# Patient Record
Sex: MALE | Race: BLACK OR AFRICAN AMERICAN | NOT HISPANIC OR LATINO | Employment: UNEMPLOYED | URBAN - METROPOLITAN AREA
[De-identification: names, ages, dates, MRNs, and addresses within clinical notes are randomized per-mention and may not be internally consistent; named-entity substitution may affect disease eponyms.]

---

## 2023-04-28 LAB
ALANINE AMINOTRANSFERASE (SGPT) (U/L) IN SER/PLAS: 43 U/L (ref 10–52)
ALBUMIN (G/DL) IN SER/PLAS: 4.5 G/DL (ref 3.4–5)
ALKALINE PHOSPHATASE (U/L) IN SER/PLAS: 89 U/L (ref 33–120)
ANION GAP IN SER/PLAS: 16 MMOL/L (ref 10–20)
ASPARTATE AMINOTRANSFERASE (SGOT) (U/L) IN SER/PLAS: 24 U/L (ref 9–39)
BASOPHILS (10*3/UL) IN BLOOD BY AUTOMATED COUNT: 0.03 X10E9/L (ref 0–0.1)
BASOPHILS/100 LEUKOCYTES IN BLOOD BY AUTOMATED COUNT: 0.4 % (ref 0–2)
BILIRUBIN TOTAL (MG/DL) IN SER/PLAS: 0.5 MG/DL (ref 0–1.2)
CALCIUM (MG/DL) IN SER/PLAS: 9.2 MG/DL (ref 8.6–10.6)
CARBON DIOXIDE, TOTAL (MMOL/L) IN SER/PLAS: 26 MMOL/L (ref 21–32)
CD3+CD4+ ABSOLUTE: 1.15 X10E9/L (ref 0.35–2.74)
CD3+CD8+ ABSOLUTE: 0.96 X10E9/L (ref 0.08–1.49)
CD4/CD8 RATIO: 1.2 (ref 1–3.5)
CD45%: 100 %
CHLORIDE (MMOL/L) IN SER/PLAS: 105 MMOL/L (ref 98–107)
CHOLESTEROL (MG/DL) IN SER/PLAS: 218 MG/DL (ref 0–199)
CHOLESTEROL IN HDL (MG/DL) IN SER/PLAS: 47.6 MG/DL
CHOLESTEROL/HDL RATIO: 4.6
CP CD3+CD4+%: 42 % (ref 29–57)
CP CD3+CD8+%: 35 % (ref 7–31)
CREATININE (MG/DL) IN SER/PLAS: 1.07 MG/DL (ref 0.5–1.3)
EOSINOPHILS (10*3/UL) IN BLOOD BY AUTOMATED COUNT: 0.12 X10E9/L (ref 0–0.7)
EOSINOPHILS/100 LEUKOCYTES IN BLOOD BY AUTOMATED COUNT: 1.5 % (ref 0–6)
ERYTHROCYTE DISTRIBUTION WIDTH (RATIO) BY AUTOMATED COUNT: 12.9 % (ref 11.5–14.5)
ERYTHROCYTE MEAN CORPUSCULAR HEMOGLOBIN CONCENTRATION (G/DL) BY AUTOMATED: 31.9 G/DL (ref 32–36)
ERYTHROCYTE MEAN CORPUSCULAR VOLUME (FL) BY AUTOMATED COUNT: 95 FL (ref 80–100)
ERYTHROCYTES (10*6/UL) IN BLOOD BY AUTOMATED COUNT: 4.73 X10E12/L (ref 4.5–5.9)
ESTIMATED AVERAGE GLUCOSE FOR HBA1C: 108 MG/DL
FMETH: ABNORMAL
FSIT1: ABNORMAL
GFR MALE: >90 ML/MIN/1.73M2
GLUCOSE (MG/DL) IN SER/PLAS: 68 MG/DL (ref 74–99)
HEMATOCRIT (%) IN BLOOD BY AUTOMATED COUNT: 45.1 % (ref 41–52)
HEMOGLOBIN (G/DL) IN BLOOD: 14.4 G/DL (ref 13.5–17.5)
HEMOGLOBIN A1C/HEMOGLOBIN TOTAL IN BLOOD: 5.4 %
HIV-1 RNA PCR VIRAL LOAD LOG: ABNORMAL LOG10 CPY/ML
HIV-1 RNA VIRAL LOAD: ABNORMAL COPIES/ML
IMMATURE GRANULOCYTES/100 LEUKOCYTES IN BLOOD BY AUTOMATED COUNT: 0.4 % (ref 0–0.9)
LDL: 142 MG/DL (ref 0–119)
LEUKOCYTES (10*3/UL) IN BLOOD BY AUTOMATED COUNT: 8.1 X10E9/L (ref 4.4–11.3)
LYMPHOCYTES (10*3/UL) IN BLOOD BY AUTOMATED COUNT: 2.73 X10E9/L (ref 1.2–4.8)
LYMPHOCYTES/100 LEUKOCYTES IN BLOOD BY AUTOMATED COUNT: 33.9 % (ref 13–44)
MONOCYTES (10*3/UL) IN BLOOD BY AUTOMATED COUNT: 0.75 X10E9/L (ref 0.1–1)
MONOCYTES/100 LEUKOCYTES IN BLOOD BY AUTOMATED COUNT: 9.3 % (ref 2–10)
NEUTROPHILS (10*3/UL) IN BLOOD BY AUTOMATED COUNT: 4.39 X10E9/L (ref 1.2–7.7)
NEUTROPHILS/100 LEUKOCYTES IN BLOOD BY AUTOMATED COUNT: 54.5 % (ref 40–80)
NRBC (PER 100 WBCS) BY AUTOMATED COUNT: 0 /100 WBC (ref 0–0)
PLATELETS (10*3/UL) IN BLOOD AUTOMATED COUNT: 295 X10E9/L (ref 150–450)
POTASSIUM (MMOL/L) IN SER/PLAS: 4.5 MMOL/L (ref 3.5–5.3)
PROTEIN TOTAL: 7.5 G/DL (ref 6.4–8.2)
SODIUM (MMOL/L) IN SER/PLAS: 142 MMOL/L (ref 136–145)
TRIGLYCERIDE (MG/DL) IN SER/PLAS: 143 MG/DL (ref 0–149)
UREA NITROGEN (MG/DL) IN SER/PLAS: 10 MG/DL (ref 6–23)
VLDL: 29 MG/DL (ref 0–40)

## 2023-08-17 LAB
ALANINE AMINOTRANSFERASE (SGPT) (U/L) IN SER/PLAS: 36 U/L (ref 10–52)
ALBUMIN (G/DL) IN SER/PLAS: 4.4 G/DL (ref 3.4–5)
ALKALINE PHOSPHATASE (U/L) IN SER/PLAS: 100 U/L (ref 33–120)
ANION GAP IN SER/PLAS: 13 MMOL/L (ref 10–20)
ASPARTATE AMINOTRANSFERASE (SGOT) (U/L) IN SER/PLAS: 20 U/L (ref 9–39)
BASOPHILS (10*3/UL) IN BLOOD BY AUTOMATED COUNT: 0.04 X10E9/L (ref 0–0.1)
BASOPHILS/100 LEUKOCYTES IN BLOOD BY AUTOMATED COUNT: 0.5 % (ref 0–2)
BILIRUBIN TOTAL (MG/DL) IN SER/PLAS: 0.5 MG/DL (ref 0–1.2)
CALCIUM (MG/DL) IN SER/PLAS: 9.4 MG/DL (ref 8.6–10.6)
CARBON DIOXIDE, TOTAL (MMOL/L) IN SER/PLAS: 28 MMOL/L (ref 21–32)
CHLORIDE (MMOL/L) IN SER/PLAS: 104 MMOL/L (ref 98–107)
CREATININE (MG/DL) IN SER/PLAS: 1.18 MG/DL (ref 0.5–1.3)
EOSINOPHILS (10*3/UL) IN BLOOD BY AUTOMATED COUNT: 0.09 X10E9/L (ref 0–0.7)
EOSINOPHILS/100 LEUKOCYTES IN BLOOD BY AUTOMATED COUNT: 1.2 % (ref 0–6)
ERYTHROCYTE DISTRIBUTION WIDTH (RATIO) BY AUTOMATED COUNT: 12.7 % (ref 11.5–14.5)
ERYTHROCYTE MEAN CORPUSCULAR HEMOGLOBIN CONCENTRATION (G/DL) BY AUTOMATED: 33.5 G/DL (ref 32–36)
ERYTHROCYTE MEAN CORPUSCULAR VOLUME (FL) BY AUTOMATED COUNT: 96 FL (ref 80–100)
ERYTHROCYTES (10*6/UL) IN BLOOD BY AUTOMATED COUNT: 4.5 X10E12/L (ref 4.5–5.9)
GFR MALE: 87 ML/MIN/1.73M2
GLUCOSE (MG/DL) IN SER/PLAS: 67 MG/DL (ref 74–99)
HEMATOCRIT (%) IN BLOOD BY AUTOMATED COUNT: 43.3 % (ref 41–52)
HEMOGLOBIN (G/DL) IN BLOOD: 14.5 G/DL (ref 13.5–17.5)
IMMATURE GRANULOCYTES/100 LEUKOCYTES IN BLOOD BY AUTOMATED COUNT: 0.4 % (ref 0–0.9)
LEUKOCYTES (10*3/UL) IN BLOOD BY AUTOMATED COUNT: 7.3 X10E9/L (ref 4.4–11.3)
LYMPHOCYTES (10*3/UL) IN BLOOD BY AUTOMATED COUNT: 2.33 X10E9/L (ref 1.2–4.8)
LYMPHOCYTES/100 LEUKOCYTES IN BLOOD BY AUTOMATED COUNT: 31.8 % (ref 13–44)
MONOCYTES (10*3/UL) IN BLOOD BY AUTOMATED COUNT: 0.62 X10E9/L (ref 0.1–1)
MONOCYTES/100 LEUKOCYTES IN BLOOD BY AUTOMATED COUNT: 8.5 % (ref 2–10)
NEUTROPHILS (10*3/UL) IN BLOOD BY AUTOMATED COUNT: 4.22 X10E9/L (ref 1.2–7.7)
NEUTROPHILS/100 LEUKOCYTES IN BLOOD BY AUTOMATED COUNT: 57.6 % (ref 40–80)
NRBC (PER 100 WBCS) BY AUTOMATED COUNT: 0 /100 WBC (ref 0–0)
PLATELETS (10*3/UL) IN BLOOD AUTOMATED COUNT: 275 X10E9/L (ref 150–450)
POTASSIUM (MMOL/L) IN SER/PLAS: 4.1 MMOL/L (ref 3.5–5.3)
PROTEIN TOTAL: 7.4 G/DL (ref 6.4–8.2)
SODIUM (MMOL/L) IN SER/PLAS: 141 MMOL/L (ref 136–145)
UREA NITROGEN (MG/DL) IN SER/PLAS: 13 MG/DL (ref 6–23)

## 2023-08-18 LAB
CD3+CD4+ ABSOLUTE: 1 X10E9/L (ref 0.35–2.74)
CD3+CD8+ ABSOLUTE: 0.82 X10E9/L (ref 0.08–1.49)
CD4/CD8 RATIO: 1.23 (ref 1–3.5)
CD45%: 100 %
CHLAMYDIA TRACH., AMPLIFIED: NEGATIVE
CHLAMYDIA TRACH., AMPLIFIED: NEGATIVE
CP CD3+CD4+%: 43 % (ref 29–57)
CP CD3+CD8+%: 35 % (ref 7–31)
FMETH: ABNORMAL
FSIT1: ABNORMAL
HIV-1 RNA PCR VIRAL LOAD LOG: NORMAL LOG10 CPY/ML
HIV-1 RNA VIRAL LOAD: NOT DETECTED COPIES/ML
N. GONORRHEA, AMPLIFIED: NEGATIVE
N. GONORRHEA, AMPLIFIED: NEGATIVE
SYPHILIS TOTAL AB: NONREACTIVE

## 2023-10-19 DIAGNOSIS — B20 HUMAN IMMUNODEFICIENCY VIRUS (MULTI): ICD-10-CM

## 2023-10-19 RX ORDER — BICTEGRAVIR SODIUM, EMTRICITABINE, AND TENOFOVIR ALAFENAMIDE FUMARATE 50; 200; 25 MG/1; MG/1; MG/1
1 TABLET ORAL DAILY
COMMUNITY
End: 2023-10-19 | Stop reason: SDUPTHER

## 2023-10-19 RX ORDER — BICTEGRAVIR SODIUM, EMTRICITABINE, AND TENOFOVIR ALAFENAMIDE FUMARATE 50; 200; 25 MG/1; MG/1; MG/1
1 TABLET ORAL DAILY
Qty: 30 TABLET | Refills: 5 | Status: SHIPPED | OUTPATIENT
Start: 2023-10-19 | End: 2024-04-26 | Stop reason: SDUPTHER

## 2023-10-19 RX ORDER — ACETAMINOPHEN 500 MG
TABLET ORAL
COMMUNITY
End: 2024-04-26 | Stop reason: SDUPTHER

## 2024-01-02 ENCOUNTER — TELEPHONE (OUTPATIENT)
Dept: IMMUNOLOGY | Facility: CLINIC | Age: 29
End: 2024-01-02
Payer: COMMERCIAL

## 2024-01-04 ENCOUNTER — TELEPHONE (OUTPATIENT)
Dept: IMMUNOLOGY | Facility: CLINIC | Age: 29
End: 2024-01-04
Payer: COMMERCIAL

## 2024-01-11 ENCOUNTER — TELEPHONE (OUTPATIENT)
Dept: IMMUNOLOGY | Facility: CLINIC | Age: 29
End: 2024-01-11
Payer: COMMERCIAL
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
## 2024-01-16 ENCOUNTER — TELEPHONE (OUTPATIENT)
Dept: IMMUNOLOGY | Facility: CLINIC | Age: 29
End: 2024-01-16
Payer: COMMERCIAL

## 2024-01-16 NOTE — TELEPHONE ENCOUNTER
Time spent: 15 minutes  Sw contacted pt re: ohdap renewal and appt reschedule.   Pt will send paystubs to sw.  Sw notified of next appt.  Pt reports medication adherence.  Pt has no other questions for sw at this time.

## 2024-02-19 ENCOUNTER — TELEPHONE (OUTPATIENT)
Dept: IMMUNOLOGY | Facility: CLINIC | Age: 29
End: 2024-02-19
Payer: COMMERCIAL

## 2024-02-19 NOTE — TELEPHONE ENCOUNTER
Hung attempted to reach pt to check in and remind of appt this week.  Sw to meet with pt at appt to complete CM updates.  Hung LM for pt.

## 2024-02-21 ENCOUNTER — TELEPHONE (OUTPATIENT)
Dept: IMMUNOLOGY | Facility: CLINIC | Age: 29
End: 2024-02-21
Payer: COMMERCIAL

## 2024-03-19 ENCOUNTER — TELEPHONE (OUTPATIENT)
Dept: INFECTIOUS DISEASES | Facility: HOSPITAL | Age: 29
End: 2024-03-19
Payer: COMMERCIAL

## 2024-03-19 NOTE — TELEPHONE ENCOUNTER
Sw attempted to reach pt to check in and update CM paperwork.  Sw unable to reach.  Sw to inactivate in CM at this time and re-visit when pt returns to appt.     [facility] Case Management Inactivation      Date: 3/19/2024    Reason for inactivation: unable to reach      []  I have discussed with patient.  Patient aware of inactivation.          []  I have not attempted to contact client because they indicated that they did not want to be contacted.        [x]  I have attempted to contact the client to conduct case management activities.  The client has not responded.  I attempted:           [x]  Phone call         []  Mail outreach       []  I have referred patient to [facility] to try to re-engage in care.      At this time this client will now be inactive in case management.

## 2024-04-26 DIAGNOSIS — B20 HUMAN IMMUNODEFICIENCY VIRUS (MULTI): ICD-10-CM

## 2024-04-26 DIAGNOSIS — R79.89 LOW VITAMIN D LEVEL: ICD-10-CM

## 2024-04-26 RX ORDER — BICTEGRAVIR SODIUM, EMTRICITABINE, AND TENOFOVIR ALAFENAMIDE FUMARATE 50; 200; 25 MG/1; MG/1; MG/1
1 TABLET ORAL DAILY
Qty: 30 TABLET | Refills: 1 | Status: SHIPPED | OUTPATIENT
Start: 2024-04-26

## 2024-04-26 RX ORDER — ACETAMINOPHEN 500 MG
2000 TABLET ORAL DAILY
Qty: 30 CAPSULE | Refills: 1 | Status: SHIPPED | OUTPATIENT
Start: 2024-04-26 | End: 2024-05-16 | Stop reason: SDUPTHER

## 2024-04-26 NOTE — PROGRESS NOTES
Pt called for refill on Biktarvy.  Also scheduled visit with Dr. John as he has not been seen since last summer.

## 2024-04-29 ENCOUNTER — TELEPHONE (OUTPATIENT)
Dept: INFECTIOUS DISEASES | Facility: HOSPITAL | Age: 29
End: 2024-04-29
Payer: COMMERCIAL

## 2024-05-16 DIAGNOSIS — R79.89 LOW VITAMIN D LEVEL: ICD-10-CM

## 2024-05-16 RX ORDER — ACETAMINOPHEN 500 MG
2000 TABLET ORAL DAILY
Qty: 90 CAPSULE | Refills: 1 | Status: SHIPPED | OUTPATIENT
Start: 2024-05-16

## 2024-05-22 ENCOUNTER — HOSPITAL ENCOUNTER (EMERGENCY)
Facility: HOSPITAL | Age: 29
Discharge: HOME | End: 2024-05-22
Payer: COMMERCIAL

## 2024-05-22 ENCOUNTER — APPOINTMENT (OUTPATIENT)
Dept: RADIOLOGY | Facility: HOSPITAL | Age: 29
End: 2024-05-22
Payer: COMMERCIAL

## 2024-05-22 VITALS
WEIGHT: 220 LBS | OXYGEN SATURATION: 99 % | HEIGHT: 67 IN | SYSTOLIC BLOOD PRESSURE: 158 MMHG | HEART RATE: 98 BPM | BODY MASS INDEX: 34.53 KG/M2 | DIASTOLIC BLOOD PRESSURE: 88 MMHG | TEMPERATURE: 97.9 F | RESPIRATION RATE: 18 BRPM

## 2024-05-22 DIAGNOSIS — S62.340A CLOSED NONDISPLACED FRACTURE OF BASE OF SECOND METACARPAL BONE OF RIGHT HAND, INITIAL ENCOUNTER: Primary | ICD-10-CM

## 2024-05-22 PROCEDURE — 99283 EMERGENCY DEPT VISIT LOW MDM: CPT | Mod: 25

## 2024-05-22 PROCEDURE — 29075 APPL CST ELBW FNGR SHORT ARM: CPT | Performed by: PHYSICIAN ASSISTANT

## 2024-05-22 PROCEDURE — 73130 X-RAY EXAM OF HAND: CPT | Mod: RT

## 2024-05-22 PROCEDURE — 73130 X-RAY EXAM OF HAND: CPT | Mod: RIGHT SIDE | Performed by: RADIOLOGY

## 2024-05-22 RX ORDER — ACETAMINOPHEN 325 MG/1
975 TABLET ORAL ONCE
Status: COMPLETED | OUTPATIENT
Start: 2024-05-22 | End: 2024-05-22

## 2024-05-22 RX ADMIN — ACETAMINOPHEN 975 MG: 325 TABLET ORAL at 13:17

## 2024-05-22 ASSESSMENT — PAIN SCALES - GENERAL
PAINLEVEL_OUTOF10: 6
PAINLEVEL_OUTOF10: 6

## 2024-05-22 ASSESSMENT — COLUMBIA-SUICIDE SEVERITY RATING SCALE - C-SSRS
2. HAVE YOU ACTUALLY HAD ANY THOUGHTS OF KILLING YOURSELF?: NO
6. HAVE YOU EVER DONE ANYTHING, STARTED TO DO ANYTHING, OR PREPARED TO DO ANYTHING TO END YOUR LIFE?: NO
1. IN THE PAST MONTH, HAVE YOU WISHED YOU WERE DEAD OR WISHED YOU COULD GO TO SLEEP AND NOT WAKE UP?: NO

## 2024-05-22 ASSESSMENT — PAIN - FUNCTIONAL ASSESSMENT
PAIN_FUNCTIONAL_ASSESSMENT: 0-10
PAIN_FUNCTIONAL_ASSESSMENT: 0-10
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
## 2024-05-23 ENCOUNTER — SOCIAL WORK (OUTPATIENT)
Dept: INFECTIOUS DISEASES | Facility: HOSPITAL | Age: 29
End: 2024-05-23
Payer: COMMERCIAL

## 2024-05-23 ENCOUNTER — OFFICE VISIT (OUTPATIENT)
Dept: IMMUNOLOGY | Facility: CLINIC | Age: 29
End: 2024-05-23
Payer: COMMERCIAL

## 2024-05-23 VITALS
WEIGHT: 234 LBS | SYSTOLIC BLOOD PRESSURE: 150 MMHG | RESPIRATION RATE: 16 BRPM | BODY MASS INDEX: 36.73 KG/M2 | OXYGEN SATURATION: 96 % | DIASTOLIC BLOOD PRESSURE: 93 MMHG | HEIGHT: 67 IN | HEART RATE: 95 BPM | TEMPERATURE: 97.1 F

## 2024-05-23 DIAGNOSIS — I10 HYPERTENSION, UNSPECIFIED TYPE: ICD-10-CM

## 2024-05-23 DIAGNOSIS — Z11.3 ROUTINE SCREENING FOR STI (SEXUALLY TRANSMITTED INFECTION): ICD-10-CM

## 2024-05-23 DIAGNOSIS — B20 HIV INFECTION, UNSPECIFIED SYMPTOM STATUS (MULTI): Primary | ICD-10-CM

## 2024-05-23 PROBLEM — Z21 HIV-1 INFECTION: Status: ACTIVE | Noted: 2024-05-23

## 2024-05-23 PROCEDURE — 81001 URINALYSIS AUTO W/SCOPE: CPT | Performed by: INTERNAL MEDICINE

## 2024-05-23 PROCEDURE — 80053 COMPREHEN METABOLIC PANEL: CPT | Performed by: INTERNAL MEDICINE

## 2024-05-23 PROCEDURE — 83036 HEMOGLOBIN GLYCOSYLATED A1C: CPT | Performed by: INTERNAL MEDICINE

## 2024-05-23 PROCEDURE — 3077F SYST BP >= 140 MM HG: CPT | Performed by: INTERNAL MEDICINE

## 2024-05-23 PROCEDURE — 85025 COMPLETE CBC W/AUTO DIFF WBC: CPT | Performed by: INTERNAL MEDICINE

## 2024-05-23 PROCEDURE — 99214 OFFICE O/P EST MOD 30 MIN: CPT | Performed by: INTERNAL MEDICINE

## 2024-05-23 PROCEDURE — 87491 CHLMYD TRACH DNA AMP PROBE: CPT | Performed by: INTERNAL MEDICINE

## 2024-05-23 PROCEDURE — 80061 LIPID PANEL: CPT | Performed by: INTERNAL MEDICINE

## 2024-05-23 PROCEDURE — 87536 HIV-1 QUANT&REVRSE TRNSCRPJ: CPT | Performed by: INTERNAL MEDICINE

## 2024-05-23 PROCEDURE — 36415 COLL VENOUS BLD VENIPUNCTURE: CPT | Performed by: INTERNAL MEDICINE

## 2024-05-23 PROCEDURE — 86780 TREPONEMA PALLIDUM: CPT | Performed by: INTERNAL MEDICINE

## 2024-05-23 PROCEDURE — 88185 FLOWCYTOMETRY/TC ADD-ON: CPT | Mod: TC | Performed by: INTERNAL MEDICINE

## 2024-05-23 PROCEDURE — 3080F DIAST BP >= 90 MM HG: CPT | Performed by: INTERNAL MEDICINE

## 2024-05-23 RX ORDER — LOSARTAN POTASSIUM 25 MG/1
25 TABLET ORAL DAILY
Qty: 30 TABLET | Refills: 3 | Status: SHIPPED | OUTPATIENT
Start: 2024-05-23

## 2024-05-23 ASSESSMENT — PAIN SCALES - GENERAL: PAINLEVEL: 5
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
## 2024-05-23 NOTE — LETTER
05/23/24    Rex Huston DO  Office Address Unavailable  As Of 7/16/2022      Dear Dr. Rex Huston DO,    I am writing to confirm that your patient, Derrick Forbes, received care in my office on 05/23/24. I have enclosed a summary of the care provided to Derrick for your reference.    Please contact me with any questions you may have regarding the visit.    Sincerely,         Dipika John MD  [address] [address] [phone]    CC: No Recipients
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
## 2024-05-24 LAB
ALBUMIN SERPL BCP-MCNC: 4.4 G/DL (ref 3.4–5)
ALP SERPL-CCNC: 99 U/L (ref 33–120)
ALT SERPL W P-5'-P-CCNC: 43 U/L (ref 10–52)
ANION GAP SERPL CALC-SCNC: 17 MMOL/L (ref 10–20)
APPEARANCE UR: CLEAR
AST SERPL W P-5'-P-CCNC: 23 U/L (ref 9–39)
BASOPHILS # BLD AUTO: 0.04 X10*3/UL (ref 0–0.1)
BASOPHILS NFR BLD AUTO: 0.4 %
BILIRUB SERPL-MCNC: 0.9 MG/DL (ref 0–1.2)
BILIRUB UR STRIP.AUTO-MCNC: NEGATIVE MG/DL
BUN SERPL-MCNC: 12 MG/DL (ref 6–23)
C TRACH RRNA SPEC QL NAA+PROBE: NEGATIVE
CALCIUM SERPL-MCNC: 9.6 MG/DL (ref 8.6–10.6)
CD3+CD4+ CELLS # BLD: 1.35 X10E9/L
CD3+CD4+ CELLS # BLD: 1351 /MM3
CD3+CD4+ CELLS NFR BLD: 44 %
CD3+CD4+ CELLS/CD3+CD8+ CLL BLD: 1.29 %
CD3+CD8+ CELLS # BLD: 1.04 X10E9/L
CD3+CD8+ CELLS NFR BLD: 34 %
CHLORIDE SERPL-SCNC: 104 MMOL/L (ref 98–107)
CHOLEST SERPL-MCNC: 207 MG/DL (ref 0–199)
CHOLESTEROL/HDL RATIO: 4.5
CO2 SERPL-SCNC: 25 MMOL/L (ref 21–32)
COLOR UR: YELLOW
CREAT SERPL-MCNC: 1.27 MG/DL (ref 0.5–1.3)
EGFRCR SERPLBLD CKD-EPI 2021: 80 ML/MIN/1.73M*2
EOSINOPHIL # BLD AUTO: 0.1 X10*3/UL (ref 0–0.7)
EOSINOPHIL NFR BLD AUTO: 1.1 %
ERYTHROCYTE [DISTWIDTH] IN BLOOD BY AUTOMATED COUNT: 13.2 % (ref 11.5–14.5)
EST. AVERAGE GLUCOSE BLD GHB EST-MCNC: 97 MG/DL
GLUCOSE SERPL-MCNC: 91 MG/DL (ref 74–99)
GLUCOSE UR STRIP.AUTO-MCNC: NORMAL MG/DL
HBA1C MFR BLD: 5 %
HCT VFR BLD AUTO: 47.8 % (ref 41–52)
HDLC SERPL-MCNC: 46.1 MG/DL
HGB BLD-MCNC: 14.9 G/DL (ref 13.5–17.5)
HIV1 RNA # PLAS NAA DL=20: ABNORMAL {COPIES}/ML
HIV1 RNA SPEC NAA+PROBE-LOG#: ABNORMAL {LOG_COPIES}/ML
IMM GRANULOCYTES # BLD AUTO: 0.02 X10*3/UL (ref 0–0.7)
IMM GRANULOCYTES NFR BLD AUTO: 0.2 % (ref 0–0.9)
KETONES UR STRIP.AUTO-MCNC: NEGATIVE MG/DL
LDLC SERPL CALC-MCNC: 112 MG/DL
LEUKOCYTE ESTERASE UR QL STRIP.AUTO: NEGATIVE
LYMPHOCYTES # BLD AUTO: 3.07 X10*3/UL (ref 1.2–4.8)
LYMPHOCYTES # SPEC AUTO: 3.07 X10*3/UL
LYMPHOCYTES NFR BLD AUTO: 32.9 %
MCH RBC QN AUTO: 31.6 PG (ref 26–34)
MCHC RBC AUTO-ENTMCNC: 31.2 G/DL (ref 32–36)
MCV RBC AUTO: 101 FL (ref 80–100)
MONOCYTES # BLD AUTO: 0.8 X10*3/UL (ref 0.1–1)
MONOCYTES NFR BLD AUTO: 8.6 %
N GONORRHOEA DNA SPEC QL PROBE+SIG AMP: NEGATIVE
NEUTROPHILS # BLD AUTO: 5.31 X10*3/UL (ref 1.2–7.7)
NEUTROPHILS NFR BLD AUTO: 56.8 %
NITRITE UR QL STRIP.AUTO: NEGATIVE
NON HDL CHOLESTEROL: 161 MG/DL (ref 0–149)
NRBC BLD-RTO: 0 /100 WBCS (ref 0–0)
PH UR STRIP.AUTO: 6 [PH]
PLATELET # BLD AUTO: 283 X10*3/UL (ref 150–450)
POTASSIUM SERPL-SCNC: 3.7 MMOL/L (ref 3.5–5.3)
PROT SERPL-MCNC: 7.5 G/DL (ref 6.4–8.2)
PROT UR STRIP.AUTO-MCNC: ABNORMAL MG/DL
RBC # BLD AUTO: 4.72 X10*6/UL (ref 4.5–5.9)
RBC # UR STRIP.AUTO: NEGATIVE /UL
RBC #/AREA URNS AUTO: NORMAL /HPF
SODIUM SERPL-SCNC: 142 MMOL/L (ref 136–145)
SP GR UR STRIP.AUTO: 1.03
TREPONEMA PALLIDUM IGG+IGM AB [PRESENCE] IN SERUM OR PLASMA BY IMMUNOASSAY: NONREACTIVE
TRIGL SERPL-MCNC: 245 MG/DL (ref 0–149)
UROBILINOGEN UR STRIP.AUTO-MCNC: ABNORMAL MG/DL
VLDL: 49 MG/DL (ref 0–40)
WBC # BLD AUTO: 9.3 X10*3/UL (ref 4.4–11.3)
WBC #/AREA URNS AUTO: NORMAL /HPF

## 2024-05-29 ENCOUNTER — HOSPITAL ENCOUNTER (OUTPATIENT)
Dept: RADIOLOGY | Facility: CLINIC | Age: 29
Discharge: HOME | End: 2024-05-29
Payer: COMMERCIAL

## 2024-05-29 ENCOUNTER — OFFICE VISIT (OUTPATIENT)
Dept: ORTHOPEDIC SURGERY | Facility: CLINIC | Age: 29
End: 2024-05-29
Payer: COMMERCIAL

## 2024-05-29 VITALS — HEIGHT: 68 IN | BODY MASS INDEX: 35.46 KG/M2 | WEIGHT: 234 LBS

## 2024-05-29 DIAGNOSIS — S62.310A DISPLACED FRACTURE OF BASE OF SECOND METACARPAL BONE, RIGHT HAND, INITIAL ENCOUNTER FOR CLOSED FRACTURE: Primary | ICD-10-CM

## 2024-05-29 DIAGNOSIS — M79.641 HAND PAIN, RIGHT: ICD-10-CM

## 2024-05-29 PROCEDURE — 73130 X-RAY EXAM OF HAND: CPT | Mod: RT

## 2024-05-29 PROCEDURE — 73130 X-RAY EXAM OF HAND: CPT | Mod: RIGHT SIDE | Performed by: RADIOLOGY

## 2024-05-29 PROCEDURE — 99203 OFFICE O/P NEW LOW 30 MIN: CPT | Performed by: ORTHOPAEDIC SURGERY
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
## 2024-06-03 ENCOUNTER — OFFICE VISIT (OUTPATIENT)
Dept: ORTHOPEDIC SURGERY | Facility: CLINIC | Age: 29
End: 2024-06-03
Payer: COMMERCIAL

## 2024-06-03 DIAGNOSIS — S62.310A DISPLACED FRACTURE OF BASE OF SECOND METACARPAL BONE, RIGHT HAND, INITIAL ENCOUNTER FOR CLOSED FRACTURE: Primary | ICD-10-CM

## 2024-06-03 PROCEDURE — 26600 TREAT METACARPAL FRACTURE: CPT | Performed by: ORTHOPAEDIC SURGERY
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
## 2024-06-04 PROCEDURE — L3809 WHFO W/O JOINTS PRE OTS: HCPCS | Performed by: ORTHOPAEDIC SURGERY

## 2024-06-24 ENCOUNTER — APPOINTMENT (OUTPATIENT)
Dept: ORTHOPEDIC SURGERY | Facility: CLINIC | Age: 29
End: 2024-06-24
Payer: COMMERCIAL

## 2024-06-24 ENCOUNTER — HOSPITAL ENCOUNTER (OUTPATIENT)
Dept: RADIOLOGY | Facility: CLINIC | Age: 29
Discharge: HOME | End: 2024-06-24
Payer: COMMERCIAL

## 2024-06-24 VITALS — WEIGHT: 234 LBS | HEIGHT: 68 IN | BODY MASS INDEX: 35.46 KG/M2

## 2024-06-24 DIAGNOSIS — S62.340A NONDISPLACED FRACTURE OF BASE OF SECOND METACARPAL BONE, RIGHT HAND, INITIAL ENCOUNTER FOR CLOSED FRACTURE: Primary | ICD-10-CM

## 2024-06-24 DIAGNOSIS — S62.340A NONDISPLACED FRACTURE OF BASE OF SECOND METACARPAL BONE, RIGHT HAND, INITIAL ENCOUNTER FOR CLOSED FRACTURE: ICD-10-CM

## 2024-06-24 PROCEDURE — 73130 X-RAY EXAM OF HAND: CPT | Mod: RT

## 2024-06-24 PROCEDURE — 99024 POSTOP FOLLOW-UP VISIT: CPT | Performed by: ORTHOPAEDIC SURGERY

## 2024-06-24 PROCEDURE — 73130 X-RAY EXAM OF HAND: CPT | Mod: RIGHT SIDE | Performed by: RADIOLOGY

## 2024-06-24 NOTE — PROGRESS NOTES
Subjective    Patient ID: Derrick Forbes is a 28 y.o. male.    Chief Complaint: Follow-up of the Right Hand (INDEX FINGER)     Last Surgery: No surgery found  Last Surgery Date: No surgery found    HPI  Patient comes in for follow-up of his right second metacarpal fracture.  This was treated conservatively.  Patient states he has no complaints of pain.  He denies numbness or paresthesias.    Objective   Ortho Exam  Patient is in no acute distress peer exam of his right hand reveals a skin envelope is intact.  He has no tenderness to palpation over the second metacarpal base.  He has full range of motion in his right index finger.  There is no malrotation.    Image Results:  X-rays of his right hand were personally reviewed today.  Fracture is maintained adequate alignment.  There is evidence of callus forming across the fracture site.    Assessment/Plan   Encounter Diagnoses:  Nondisplaced fracture of base of second metacarpal bone, right hand, initial encounter for closed fracture    Orders Placed This Encounter    XR hand right 3+ views     Patient has a healed right second metacarpal fracture.  He may use his right hand is much as he can tolerate.  He will follow-up as his symptoms dictate.

## 2024-06-25 DIAGNOSIS — B20 HUMAN IMMUNODEFICIENCY VIRUS (MULTI): ICD-10-CM

## 2024-06-25 RX ORDER — BICTEGRAVIR SODIUM, EMTRICITABINE, AND TENOFOVIR ALAFENAMIDE FUMARATE 50; 200; 25 MG/1; MG/1; MG/1
1 TABLET ORAL DAILY
Qty: 30 TABLET | Refills: 2 | Status: SHIPPED | OUTPATIENT
Start: 2024-06-25

## 2024-07-01 ENCOUNTER — TELEPHONE (OUTPATIENT)
Dept: INFECTIOUS DISEASES | Facility: HOSPITAL | Age: 29
End: 2024-07-01
Payer: COMMERCIAL

## 2024-07-01 NOTE — TELEPHONE ENCOUNTER
Time: 15 minutes  Sw contacted pt re: OHDAP renewal.  Pt will submit first paystub from new job.  Pt starting new job 7/8 - insurance will start right away.  Pt will also send insurance card.  Pt has no other questions for sw at this time.

## 2024-07-11 ENCOUNTER — TELEPHONE (OUTPATIENT)
Dept: INFECTIOUS DISEASES | Facility: HOSPITAL | Age: 29
End: 2024-07-11
Payer: COMMERCIAL

## 2024-07-23 ENCOUNTER — TELEPHONE (OUTPATIENT)
Dept: INFECTIOUS DISEASES | Facility: HOSPITAL | Age: 29
End: 2024-07-23
Payer: COMMERCIAL

## 2024-07-23 NOTE — TELEPHONE ENCOUNTER
Time: 15 minutes  Sw received call from pt.  Pt will try to send new job information to sw this week for ohdap renewal.  Pt wants to know next appt - sw confirmed with pt.  Pt would like to come in for RN visit before appt for STI check.  Sw to communicate to RN.  Pt has no other questions for sw at this time.

## 2024-07-25 DIAGNOSIS — I10 HYPERTENSION, UNSPECIFIED TYPE: ICD-10-CM

## 2024-07-25 DIAGNOSIS — R79.89 LOW VITAMIN D LEVEL: ICD-10-CM

## 2024-07-25 DIAGNOSIS — B20 HUMAN IMMUNODEFICIENCY VIRUS (MULTI): ICD-10-CM

## 2024-07-25 RX ORDER — ACETAMINOPHEN 500 MG
2000 TABLET ORAL DAILY
Qty: 90 CAPSULE | Refills: 3 | Status: SHIPPED | OUTPATIENT
Start: 2024-07-25

## 2024-07-25 RX ORDER — LOSARTAN POTASSIUM 25 MG/1
25 TABLET ORAL DAILY
Qty: 30 TABLET | Refills: 3 | Status: SHIPPED | OUTPATIENT
Start: 2024-07-25

## 2024-07-25 RX ORDER — BICTEGRAVIR SODIUM, EMTRICITABINE, AND TENOFOVIR ALAFENAMIDE FUMARATE 50; 200; 25 MG/1; MG/1; MG/1
1 TABLET ORAL DAILY
Qty: 30 TABLET | Refills: 3 | Status: SHIPPED | OUTPATIENT
Start: 2024-07-25

## 2024-07-30 ENCOUNTER — TELEPHONE (OUTPATIENT)
Dept: INFECTIOUS DISEASES | Facility: HOSPITAL | Age: 29
End: 2024-07-30
Payer: COMMERCIAL

## 2024-07-31 ENCOUNTER — CLINICAL SUPPORT (OUTPATIENT)
Dept: IMMUNOLOGY | Facility: CLINIC | Age: 29
End: 2024-07-31
Payer: COMMERCIAL

## 2024-07-31 DIAGNOSIS — Z21 ASYMPTOMATIC HIV INFECTION, WITH NO HISTORY OF HIV-RELATED ILLNESS (MULTI): ICD-10-CM

## 2024-07-31 LAB — TREPONEMA PALLIDUM IGG+IGM AB [PRESENCE] IN SERUM OR PLASMA BY IMMUNOASSAY: NONREACTIVE

## 2024-07-31 PROCEDURE — 87491 CHLMYD TRACH DNA AMP PROBE: CPT

## 2024-07-31 PROCEDURE — 86780 TREPONEMA PALLIDUM: CPT

## 2024-07-31 PROCEDURE — 36415 COLL VENOUS BLD VENIPUNCTURE: CPT

## 2024-08-01 LAB
C TRACH RRNA SPEC QL NAA+PROBE: NEGATIVE
N GONORRHOEA DNA SPEC QL PROBE+SIG AMP: NEGATIVE

## 2024-08-13 DIAGNOSIS — Z11.3 SCREENING EXAMINATION FOR STI: ICD-10-CM

## 2024-08-15 ENCOUNTER — CLINICAL SUPPORT (OUTPATIENT)
Dept: IMMUNOLOGY | Facility: CLINIC | Age: 29
End: 2024-08-15

## 2024-08-15 DIAGNOSIS — Z11.3 SCREENING EXAMINATION FOR STI: ICD-10-CM

## 2024-08-15 LAB
HCV AB SER QL: NONREACTIVE
HERPES SIMPLEX VIRUS 1 IGG: 3.3 INDEX
HERPES SIMPLEX VIRUS 2 IGG: <0.2 INDEX

## 2024-08-15 PROCEDURE — 36415 COLL VENOUS BLD VENIPUNCTURE: CPT

## 2024-08-15 PROCEDURE — 86803 HEPATITIS C AB TEST: CPT

## 2024-08-15 PROCEDURE — 86695 HERPES SIMPLEX TYPE 1 TEST: CPT

## 2024-09-26 ENCOUNTER — APPOINTMENT (OUTPATIENT)
Dept: IMMUNOLOGY | Facility: CLINIC | Age: 29
End: 2024-09-26
Payer: COMMERCIAL

## 2024-10-01 ENCOUNTER — APPOINTMENT (OUTPATIENT)
Dept: IMMUNOLOGY | Facility: CLINIC | Age: 29
End: 2024-10-01
Payer: COMMERCIAL

## 2024-10-14 ENCOUNTER — TELEPHONE (OUTPATIENT)
Dept: INFECTIOUS DISEASES | Facility: HOSPITAL | Age: 29
End: 2024-10-14

## 2024-10-14 NOTE — TELEPHONE ENCOUNTER
Sw attempted to reach pt to confirm upcoming appt.  Hung LM for pt.  Sw to meet with pt at 11/6 appt to update CM.

## 2024-11-06 ENCOUNTER — TELEPHONE (OUTPATIENT)
Dept: INFECTIOUS DISEASES | Facility: HOSPITAL | Age: 29
End: 2024-11-06

## 2024-11-25 ENCOUNTER — TELEPHONE (OUTPATIENT)
Dept: INFECTIOUS DISEASES | Facility: HOSPITAL | Age: 29
End: 2024-11-25

## 2024-12-03 ENCOUNTER — TELEPHONE (OUTPATIENT)
Dept: INFECTIOUS DISEASES | Facility: HOSPITAL | Age: 29
End: 2024-12-03

## 2024-12-03 NOTE — TELEPHONE ENCOUNTER
"[facility] CASE MANAGEMENT PSYCHOSOCIAL ASSESSMENT AND ISP     Sw completed CM semi-annual assessment with pt over the phone.       Basic Needs:   Income / financial concerns: pt started new job and has been working to catch up on past bills.    Transportation: pt has car and is able get to where he needs. pt reports no major changes.   Housing: pt currently staying with sister, brother in law, and niece and nephew.  Pt is pushing back his timeline to find his own place so he can get caught up on past bills.   Utilities: pt reports no major changes.    Digital Connectivity/Internet: pt reports no major changes.      Medical Care and Medication Adherence: pt has missed recent appointments - notes that \"stuff came up.\"  Pt wants to set up next appt.   Last ID appt: 2024  Next ID appt:  to be scheduled  Number of doses missed in last 7 days: 1 day of meds missed in last week     Mental Health: pt notes that after heavy drinking he recently had a bout with anxiety.  Wants to address at next MD appt.      Substance Abuse: Pt reports that he drank \"a lot\" over  - was concerned with how he felt afterwards.  Pt notes he is done drinking a while.  Sw to continue to check in with pt re: this.      Oral Health:   Last dental appt: pt had dental appointment 24.  Pt went to [facility] dental.   Does the patient have any dental concerns? Pt will call to find in-network dentist.  Pt has small cavities that need to be filled.      Health Insurance: pt has active insurance through current job.  Pt also has active ohdap for copays - set to  25.       Safety: Pt reports no unmet needs at this time.       Support System: pt feels satisfied with support system.     Sexual Health/Risk Reduction:   Sexually Active: pt reports that he has not been sexually active recently.   Disclosure: Pt reports no unmet needs at this time.   Patient aware of and expresses understanding of felonious assault disclosure " laws.  Aware that condoms are available as needed in clinic.  Patient educated on U=U.     Knowledge of HIV Disease: Pt reports no unmet needs at this time.      Legal Issues:  Pt reports no unmet needs at this time.      Acuity Score:  16  No major changes.      Goal:  pt still wants to find his own place to live, but has pushed back the timeline so he can catch up on past bills.      Patient action steps:  pt will follow up with [facility] to get housing CM.  Pt will continue to take meds and attend appts.  Pt will continue to save as he is able.  Pt to reach out for support.      Social work action steps:  Sw to assist with ohdap renewal and insurance questions.  Sw to check in and support, as needed.  Sw to assist with coordinating follow up appointment.  Sw to check in re: anxiety.      Time Spent: 30 minutes

## 2024-12-04 ENCOUNTER — TELEPHONE (OUTPATIENT)
Dept: INFECTIOUS DISEASES | Facility: HOSPITAL | Age: 29
End: 2024-12-04

## 2024-12-10 ENCOUNTER — TELEPHONE (OUTPATIENT)
Dept: INFECTIOUS DISEASES | Facility: HOSPITAL | Age: 29
End: 2024-12-10

## 2024-12-10 NOTE — TELEPHONE ENCOUNTER
Sw received call back from pt.  Sw reviewed next appt with pt.  Pt confirmed.  Pt reports medication adherence.  Pt has no other questions for sw at this time.    Time: 10 minutes

## 2024-12-23 DIAGNOSIS — B20 HUMAN IMMUNODEFICIENCY VIRUS (MULTI): ICD-10-CM

## 2024-12-23 RX ORDER — BICTEGRAVIR SODIUM, EMTRICITABINE, AND TENOFOVIR ALAFENAMIDE FUMARATE 50; 200; 25 MG/1; MG/1; MG/1
1 TABLET ORAL DAILY
Qty: 30 TABLET | Refills: 3 | Status: SHIPPED | OUTPATIENT
Start: 2024-12-23

## 2024-12-30 ENCOUNTER — DOCUMENTATION (OUTPATIENT)
Dept: IMMUNOLOGY | Facility: CLINIC | Age: 29
End: 2024-12-30

## 2024-12-30 NOTE — PROGRESS NOTES
Pt left a message on [employer] over the weekend concerned that his urine was dark over the weekend after two very intense workouts.  Pt was concerned about possible lactic acidosis from his biktarvy.  Pt was reassured that his symptoms are not likely from his biktarvy.  He reports taking in a lot of protein recently and starting with a  that worked him extremely hard.  Pt was instructed to increase his water intake, decrease protein and to do a more moderate exercise routine.  Pt verbalized understanding.

## 2025-01-10 ENCOUNTER — TELEPHONE (OUTPATIENT)
Dept: INFECTIOUS DISEASES | Facility: HOSPITAL | Age: 30
End: 2025-01-10

## 2025-01-10 NOTE — TELEPHONE ENCOUNTER
Sw received call from pt to confirm next appt.  Sw confirmed next appt and ohdap renewal.  Pt reports doing well.  Pt notes he has been working out more.  Pt has no other questions for sw at this time.    Time: 15 minutes

## 2025-02-06 ENCOUNTER — TELEPHONE (OUTPATIENT)
Dept: INFECTIOUS DISEASES | Facility: HOSPITAL | Age: 30
End: 2025-02-06

## 2025-02-06 NOTE — TELEPHONE ENCOUNTER
Sw contacted pt to check in and discuss ohdap renewal.  Sw notified pt about appt later this month.  Pt will bring paystubs to appt.  Pt reports doing well.  Pt continues to work out.  Pt reports medication adherence.  Pt has no other questions for sw at this time.    Time: 15 minutes

## 2025-02-18 ENCOUNTER — OFFICE VISIT (OUTPATIENT)
Dept: IMMUNOLOGY | Facility: CLINIC | Age: 30
End: 2025-02-18

## 2025-02-18 ENCOUNTER — SOCIAL WORK (OUTPATIENT)
Dept: INFECTIOUS DISEASES | Facility: HOSPITAL | Age: 30
End: 2025-02-18

## 2025-02-18 VITALS
SYSTOLIC BLOOD PRESSURE: 158 MMHG | HEART RATE: 70 BPM | BODY MASS INDEX: 34.36 KG/M2 | WEIGHT: 226 LBS | RESPIRATION RATE: 16 BRPM | DIASTOLIC BLOOD PRESSURE: 92 MMHG | TEMPERATURE: 97.6 F | OXYGEN SATURATION: 100 %

## 2025-02-18 DIAGNOSIS — B20 CURRENTLY ASYMPTOMATIC HIV INFECTION, WITH HISTORY OF HIV-RELATED ILLNESS (MULTI): Primary | ICD-10-CM

## 2025-02-18 DIAGNOSIS — I10 HYPERTENSION, UNSPECIFIED TYPE: ICD-10-CM

## 2025-02-18 LAB
ALBUMIN SERPL BCP-MCNC: 4.2 G/DL (ref 3.4–5)
ALP SERPL-CCNC: 73 U/L (ref 33–120)
ALT SERPL W P-5'-P-CCNC: 17 U/L (ref 10–52)
ANION GAP SERPL CALC-SCNC: 13 MMOL/L (ref 10–20)
AST SERPL W P-5'-P-CCNC: 15 U/L (ref 9–39)
BASOPHILS # BLD AUTO: 0.03 X10*3/UL (ref 0–0.1)
BASOPHILS NFR BLD AUTO: 0.5 %
BILIRUB SERPL-MCNC: 0.4 MG/DL (ref 0–1.2)
BUN SERPL-MCNC: 12 MG/DL (ref 6–23)
CALCIUM SERPL-MCNC: 9.2 MG/DL (ref 8.6–10.6)
CHLORIDE SERPL-SCNC: 104 MMOL/L (ref 98–107)
CHOLEST SERPL-MCNC: 238 MG/DL (ref 0–199)
CHOLESTEROL/HDL RATIO: 5
CO2 SERPL-SCNC: 27 MMOL/L (ref 21–32)
CREAT SERPL-MCNC: 1.22 MG/DL (ref 0.5–1.3)
EGFRCR SERPLBLD CKD-EPI 2021: 83 ML/MIN/1.73M*2
EOSINOPHIL # BLD AUTO: 0.14 X10*3/UL (ref 0–0.7)
EOSINOPHIL NFR BLD AUTO: 2.2 %
ERYTHROCYTE [DISTWIDTH] IN BLOOD BY AUTOMATED COUNT: 12.3 % (ref 11.5–14.5)
GLUCOSE SERPL-MCNC: 77 MG/DL (ref 74–99)
HCT VFR BLD AUTO: 42.4 % (ref 41–52)
HDLC SERPL-MCNC: 47.5 MG/DL
HGB BLD-MCNC: 13.6 G/DL (ref 13.5–17.5)
IMM GRANULOCYTES # BLD AUTO: 0.02 X10*3/UL (ref 0–0.7)
IMM GRANULOCYTES NFR BLD AUTO: 0.3 % (ref 0–0.9)
LDLC SERPL CALC-MCNC: 165 MG/DL
LYMPHOCYTES # BLD AUTO: 2.37 X10*3/UL (ref 1.2–4.8)
LYMPHOCYTES NFR BLD AUTO: 37.4 %
MCH RBC QN AUTO: 30.3 PG (ref 26–34)
MCHC RBC AUTO-ENTMCNC: 32.1 G/DL (ref 32–36)
MCV RBC AUTO: 94 FL (ref 80–100)
MONOCYTES # BLD AUTO: 0.63 X10*3/UL (ref 0.1–1)
MONOCYTES NFR BLD AUTO: 10 %
NEUTROPHILS # BLD AUTO: 3.14 X10*3/UL (ref 1.2–7.7)
NEUTROPHILS NFR BLD AUTO: 49.6 %
NON HDL CHOLESTEROL: 191 MG/DL (ref 0–149)
NRBC BLD-RTO: 0 /100 WBCS (ref 0–0)
PLATELET # BLD AUTO: 301 X10*3/UL (ref 150–450)
POTASSIUM SERPL-SCNC: 4 MMOL/L (ref 3.5–5.3)
PROT SERPL-MCNC: 7 G/DL (ref 6.4–8.2)
RBC # BLD AUTO: 4.49 X10*6/UL (ref 4.5–5.9)
SODIUM SERPL-SCNC: 140 MMOL/L (ref 136–145)
TREPONEMA PALLIDUM IGG+IGM AB [PRESENCE] IN SERUM OR PLASMA BY IMMUNOASSAY: NONREACTIVE
TRIGL SERPL-MCNC: 130 MG/DL (ref 0–149)
VLDL: 26 MG/DL (ref 0–40)
WBC # BLD AUTO: 6.3 X10*3/UL (ref 4.4–11.3)

## 2025-02-18 PROCEDURE — 84075 ASSAY ALKALINE PHOSPHATASE: CPT | Performed by: INTERNAL MEDICINE

## 2025-02-18 PROCEDURE — 88185 FLOWCYTOMETRY/TC ADD-ON: CPT | Performed by: INTERNAL MEDICINE

## 2025-02-18 PROCEDURE — 36415 COLL VENOUS BLD VENIPUNCTURE: CPT | Performed by: INTERNAL MEDICINE

## 2025-02-18 PROCEDURE — 99214 OFFICE O/P EST MOD 30 MIN: CPT | Performed by: INTERNAL MEDICINE

## 2025-02-18 PROCEDURE — 83718 ASSAY OF LIPOPROTEIN: CPT | Performed by: INTERNAL MEDICINE

## 2025-02-18 PROCEDURE — 3080F DIAST BP >= 90 MM HG: CPT | Performed by: INTERNAL MEDICINE

## 2025-02-18 PROCEDURE — 3077F SYST BP >= 140 MM HG: CPT | Performed by: INTERNAL MEDICINE

## 2025-02-18 PROCEDURE — 99214 OFFICE O/P EST MOD 30 MIN: CPT | Mod: 25 | Performed by: INTERNAL MEDICINE

## 2025-02-18 PROCEDURE — 85025 COMPLETE CBC W/AUTO DIFF WBC: CPT | Performed by: INTERNAL MEDICINE

## 2025-02-18 PROCEDURE — 86780 TREPONEMA PALLIDUM: CPT | Performed by: INTERNAL MEDICINE

## 2025-02-18 PROCEDURE — 87536 HIV-1 QUANT&REVRSE TRNSCRPJ: CPT | Performed by: INTERNAL MEDICINE

## 2025-02-18 PROCEDURE — 4274F FLU IMMUNO ADMIND RCVD: CPT | Performed by: INTERNAL MEDICINE

## 2025-02-18 PROCEDURE — 90656 IIV3 VACC NO PRSV 0.5 ML IM: CPT | Performed by: INTERNAL MEDICINE

## 2025-02-18 PROCEDURE — 90739 HEPB VACC 2/4 DOSE ADULT IM: CPT | Performed by: INTERNAL MEDICINE

## 2025-02-18 RX ORDER — LOSARTAN POTASSIUM 25 MG/1
25 TABLET ORAL DAILY
Qty: 30 TABLET | Refills: 5 | Status: SHIPPED | OUTPATIENT
Start: 2025-02-18

## 2025-02-18 ASSESSMENT — PAIN SCALES - GENERAL: PAINLEVEL_OUTOF10: 0-NO PAIN

## 2025-02-18 NOTE — PROGRESS NOTES
Sw met with pt at MD appt.  Pt reports doing well.  Pt still living with sister - has appt to look at possible apartment for himself later today.  Pt reports work is going well.  Pt reports to [facility].    Pt working out with a  2 days/week and notes that is going well.  Pt still needs to submit paystubs for ohdap renewal.  Pt has no other questions for sw at this time.    Time: 15 minutes

## 2025-02-18 NOTE — PROGRESS NOTES
Subjective   Derrick Forbes is a 29 y.o. male who presents to the [facility] for follow-up of HIV infection.   Doing really well. Working making aerospace parts.  Living with his sister but looking for his own apartment  Going out more with friends  Not sexually active, no relationship  Has started working out. Didn't start his lostartan, wanted to try working out first.  But will start now  Objective   Vitals:    02/18/25 0941   BP: (!) 158/92   Pulse: 70   Resp: 16   Temp: 36.4 °C (97.6 °F)   SpO2: 100%        Current Outpatient Medications:     Biktarvy -25 mg tablet, TAKE 1 TABLET BY MOUTH 1 TIME A DAY, Disp: 30 tablet, Rfl: 3    cholecalciferol (Vitamin D-3) 50 mcg (2,000 unit) capsule, Take 1 capsule (50 mcg) by mouth early in the morning.., Disp: 90 capsule, Rfl: 3    losartan (Cozaar) 25 mg tablet, Take 1 tablet (25 mg) by mouth once daily., Disp: 30 tablet, Rfl: 5   Physical Exam  Physical Exam  Constitutional:       General: not in acute distress.     Appearance: Normal appearance.   HENT:      Head: Normocephalic and atraumatic.      Pharynx: Oropharynx is clear. No oropharyngeal exudate.   Eyes:      General: No scleral icterus.  Cardiovascular:      Rate and Rhythm: Normal rate and regular rhythm.   Pulmonary:      Breath sounds: Normal breath sounds. No wheezing or rhonchi.   Abdominal:      Palpations: Abdomen is soft.      Tenderness: There is no abdominal tenderness.   Musculoskeletal:      Cervical back: Neck supple.      Right lower leg: No edema.      Left lower leg: No edema.   Skin:     Findings: No rash.   Neurological:      Mental Status: alert.   Psychiatric:         Mood and Affect: Mood normal.     Laboratory  Lab Results   Component Value Date    WUX2FWKZY NOT DETECTED 08/17/2023    IT3TLO8KJLK 1.351 05/23/2024    VITD25 12 (A) 08/16/2021      Lab Results   Component Value Date    WBC 9.3 05/23/2024    HGB 14.9 05/23/2024    HCT 47.8 05/23/2024     (H) 05/23/2024      "05/23/2024      Lab Results   Component Value Date    GLUCOSE 91 05/23/2024    CALCIUM 9.6 05/23/2024     05/23/2024    K 3.7 05/23/2024    CO2 25 05/23/2024     05/23/2024    BUN 12 05/23/2024    CREATININE 1.27 05/23/2024      Lab Results   Component Value Date    CHOL 207 (H) 05/23/2024    CHOL 218 (H) 04/27/2023    CHOL 222 (H) 01/03/2022     Lab Results   Component Value Date    HDL 46.1 05/23/2024    HDL 47.6 04/27/2023    HDL 43.6 01/03/2022     Lab Results   Component Value Date    LDLCALC 112 (H) 05/23/2024     Lab Results   Component Value Date    TRIG 245 (H) 05/23/2024    TRIG 143 04/27/2023    TRIG 121 01/03/2022     No components found for: \"CHOLHDL\"      Assessment/Plan   Problem List Items Addressed This Visit       HIV infection - Primary    Current Assessment & Plan     UD with great adherence, check labs today         Relevant Orders    CD4/8 Panel    CBC and Auto Differential    Comprehensive Metabolic Panel    HIV RNA, quantitative, PCR    Lipid Panel    Flu vaccine, trivalent, preservative free, age 6 months and greater (Fluraix/Fluzone/Flulaval)    Hepatitis B vaccine, 18yrs and older (HEPLISAV)    Syphilis Screen with Reflex    Hypertension    Current Assessment & Plan     Working out and didn't start his lostartan but will now         Relevant Medications    losartan (Cozaar) 25 mg tablet      Health Maintenance  Flu shot today and will give hepB vaccine as not immune after childhood ones  Not sexually active no sti screen  Referred to dental though he is more focused on work at this time  Dipika John MD   "

## 2025-02-18 NOTE — LETTER
02/18/25    Rex Huston DO  Office Address Unavailable  As Of 7/16/2022      Dear Dr. Rex Huston DO,    I am writing to confirm that your patient, Derrick Forbes, received care in my office on 02/18/25. I have enclosed a summary of the care provided to Derrick for your reference.    Please contact me with any questions you may have regarding the visit.    Sincerely,         Dipika John MD  [address] [address] [phone]    CC: No Recipients Nonstress Test   Patient: Neela Price    Gestation: 36w6d    NST:       Variability: Moderate           Accelerations: Yes           Decelerations: None            Baseline: 140 BPM           Uterine Irritability: No           Contractions: Not presen

## 2025-02-19 LAB
CD3+CD4+ CELLS # BLD: 1.04 X10E9/L
CD3+CD4+ CELLS # BLD: 1043 /MM3
CD3+CD4+ CELLS NFR BLD: 44 %
CD3+CD4+ CELLS/CD3+CD8+ CLL BLD: 1.19 %
CD3+CD8+ CELLS # BLD: 0.88 X10E9/L
CD3+CD8+ CELLS NFR BLD: 37 %
HIV1 RNA # PLAS NAA DL=20: NOT DETECTED {COPIES}/ML
HIV1 RNA SPEC NAA+PROBE-LOG#: NORMAL {LOG_COPIES}/ML
LYMPHOCYTES # SPEC AUTO: 2.37 X10*3/UL

## 2025-02-25 ENCOUNTER — TELEPHONE (OUTPATIENT)
Dept: INFECTIOUS DISEASES | Facility: HOSPITAL | Age: 30
End: 2025-02-25

## 2025-04-26 ENCOUNTER — OFFICE VISIT (OUTPATIENT)
Dept: URGENT CARE | Age: 30
End: 2025-04-26
Payer: COMMERCIAL

## 2025-04-26 VITALS
HEIGHT: 68 IN | BODY MASS INDEX: 33.34 KG/M2 | TEMPERATURE: 98.6 F | SYSTOLIC BLOOD PRESSURE: 136 MMHG | RESPIRATION RATE: 18 BRPM | OXYGEN SATURATION: 98 % | WEIGHT: 220 LBS | DIASTOLIC BLOOD PRESSURE: 78 MMHG | HEART RATE: 77 BPM

## 2025-04-26 DIAGNOSIS — M54.50 ACUTE RIGHT-SIDED LOW BACK PAIN, UNSPECIFIED WHETHER SCIATICA PRESENT: Primary | ICD-10-CM

## 2025-04-26 RX ORDER — METHYLPREDNISOLONE 4 MG/1
TABLET ORAL
Qty: 21 TABLET | Refills: 0 | Status: SHIPPED | OUTPATIENT
Start: 2025-04-26 | End: 2025-05-02

## 2025-04-26 RX ORDER — KETOROLAC TROMETHAMINE 30 MG/ML
30 INJECTION, SOLUTION INTRAMUSCULAR; INTRAVENOUS ONCE
Status: COMPLETED | OUTPATIENT
Start: 2025-04-26 | End: 2025-04-26

## 2025-04-26 RX ORDER — METHOCARBAMOL 500 MG/1
500 TABLET, FILM COATED ORAL 4 TIMES DAILY
Qty: 30 TABLET | Refills: 0 | Status: SHIPPED | OUTPATIENT
Start: 2025-04-26 | End: 2025-05-06

## 2025-04-26 RX ADMIN — KETOROLAC TROMETHAMINE 30 MG: 30 INJECTION, SOLUTION INTRAMUSCULAR; INTRAVENOUS at 09:45

## 2025-04-26 ASSESSMENT — ENCOUNTER SYMPTOMS
FATIGUE: 0
BACK PAIN: 1
NUMBNESS: 1

## 2025-04-26 ASSESSMENT — PATIENT HEALTH QUESTIONNAIRE - PHQ9
1. LITTLE INTEREST OR PLEASURE IN DOING THINGS: NOT AT ALL
2. FEELING DOWN, DEPRESSED OR HOPELESS: NOT AT ALL
SUM OF ALL RESPONSES TO PHQ9 QUESTIONS 1 AND 2: 0

## 2025-04-26 ASSESSMENT — PAIN SCALES - GENERAL: PAINLEVEL_OUTOF10: 8

## 2025-04-26 NOTE — PROGRESS NOTES
"Subjective   Patient ID: Derrick Forbes is a 29 y.o. male. They present today with a chief complaint of Injury (Lower right side back, pain/ from dead lifting x 1 day ).    History of Present Illness  Pt is a 29 year old who is hiv pos undetectable who presented with an acute low back injury which was frim lifting weights yesterda   Pain is lowr back about l3 with some numbness right lat  and is worse with movement  No radicular sx down legs no bowel or bladder sx      Injury  Associated symptoms: no fatigue        Past Medical History  Allergies as of 04/26/2025    (No Known Allergies)       Prescriptions Prior to Admission[1]     Medical History[2]    Surgical History[3]     reports that he has quit smoking. His smoking use included cigarettes. He has never used smokeless tobacco. He reports current alcohol use of about 2.0 - 3.0 standard drinks of alcohol per week. He reports current drug use. Drug: Marijuana.    Review of Systems  Review of Systems   Constitutional:  Negative for fatigue.   Musculoskeletal:  Positive for back pain.   Neurological:  Positive for numbness.                                  Objective    Vitals:    04/26/25 0937   BP: 136/78   Pulse: 77   Resp: 18   Temp: 37 °C (98.6 °F)   SpO2: 98%   Weight: 99.8 kg (220 lb)   Height: 1.727 m (5' 8\")     No LMP for male patient.    Physical Exam  Constitutional:       Appearance: Normal appearance.      Comments: P appears uncomfortable   Musculoskeletal:      Cervical back: Normal range of motion.      Comments: Tenderness of lower back  Strength intact sensation intact  Pain with movement   Neurological:      General: No focal deficit present.      Mental Status: He is alert.      Sensory: No sensory deficit.      Motor: No weakness.         Procedures    Point of Care Test & Imaging Results from this visit  No results found for this visit on 04/26/25.   Imaging  No results found.    Cardiology, Vascular, and Other Imaging  No other imaging results " found for the past 2 days      Diagnostic study results (if any) were reviewed by Destiny Kong MD.    Assessment/Plan   Allergies, medications, history, and pertinent labs/EKGs/Imaging reviewed by Destiny Kong MD.     Medical Decision Making  Pain imporeved after toradol  Concerne over possible acute disc injury and may need mri if sx persist or increase  Orders and Diagnoses  Diagnoses and all orders for this visit:  Acute right-sided low back pain, unspecified whether sciatica present  -     ketorolac (Toradol) injection 30 mg  -     methylPREDNISolone (Medrol Dospak) 4 mg tablets; Follow schedule on package instructions  -     methocarbamol (Robaxin) 500 mg tablet; Take 1 tablet (500 mg) by mouth 4 times a day for 10 days.      Medical Admin Record  Administrations This Visit       ketorolac (Toradol) injection 30 mg       Admin Date  04/26/2025 Action  Given Dose  30 mg Route  intramuscular Documented By  Enid Montiel MA                    Patient disposition: Home    Electronically signed by Destiny Kong MD  10:19 AM           [1] (Not in a hospital admission)  [2] No past medical history on file.  [3] No past surgical history on file.

## 2025-04-26 NOTE — LETTER
April 26, 2025     Patient: Derrick Forbes   YOB: 1995   Date of Visit: 4/26/2025       To Whom It May Concern:    It is my medical opinion that Derrick Forbes  pt should be off work till 4/29 .    If you have any questions or concerns, please don't hesitate to call.         Sincerely,        Destiny Kong MD    CC: No Recipients

## 2025-04-26 NOTE — PATIENT INSTRUCTIONS
Take motrin 600 mg every 8 hours for 3 days then as needed  Follow up pcp Monday  If your develop increased numbness go to the emergency room

## 2025-05-07 ENCOUNTER — OFFICE VISIT (OUTPATIENT)
Dept: URGENT CARE | Age: 30
End: 2025-05-07
Payer: COMMERCIAL

## 2025-05-07 VITALS
HEIGHT: 68 IN | OXYGEN SATURATION: 98 % | WEIGHT: 218 LBS | BODY MASS INDEX: 33.04 KG/M2 | DIASTOLIC BLOOD PRESSURE: 86 MMHG | RESPIRATION RATE: 16 BRPM | SYSTOLIC BLOOD PRESSURE: 134 MMHG | TEMPERATURE: 99.2 F | HEART RATE: 105 BPM

## 2025-05-07 DIAGNOSIS — Z21 ASYMPTOMATIC HIV INFECTION, WITH NO HISTORY OF HIV-RELATED ILLNESS (MULTI): Primary | ICD-10-CM

## 2025-05-07 DIAGNOSIS — R20.2 TINGLING SENSATION: ICD-10-CM

## 2025-05-07 DIAGNOSIS — F41.9 ANXIETY: ICD-10-CM

## 2025-05-07 PROCEDURE — 3075F SYST BP GE 130 - 139MM HG: CPT | Performed by: NURSE PRACTITIONER

## 2025-05-07 PROCEDURE — 99213 OFFICE O/P EST LOW 20 MIN: CPT | Performed by: NURSE PRACTITIONER

## 2025-05-07 PROCEDURE — 4274F FLU IMMUNO ADMIND RCVD: CPT | Performed by: NURSE PRACTITIONER

## 2025-05-07 PROCEDURE — 3079F DIAST BP 80-89 MM HG: CPT | Performed by: NURSE PRACTITIONER

## 2025-05-07 PROCEDURE — 3008F BODY MASS INDEX DOCD: CPT | Performed by: NURSE PRACTITIONER

## 2025-05-07 ASSESSMENT — ENCOUNTER SYMPTOMS: TREMORS: 1

## 2025-05-07 NOTE — LETTER
May 7, 2025     Patient: Derrick Forbes   YOB: 1995   Date of Visit: 5/7/2025       To Whom It May Concern:    Derrick Forbes was seen in my clinic on 5/7/2025. Please excuse Derrick for his absence from work on this day to make the appointment; returning to work 5/8/2025.    If you have any questions or concerns, please don't hesitate to call.         Sincerely,         Carla Crowell, DIEGO-CNP        CC: No Recipients

## 2025-05-07 NOTE — PROGRESS NOTES
"Subjective   Patient ID: Derrick Forbes is a 29 y.o. male. They present today with a chief complaint of Other (Numbness and tingling while driving expanding to the left and right side. Shaking. It has stopped now, but he is worried. Has not eaten, and has anxiety. ).    History of Present Illness  HPI    Past Medical History  Allergies as of 05/07/2025    (No Known Allergies)       Prescriptions Prior to Admission[1]     Medical History[2]    Surgical History[3]     reports that he has quit smoking. His smoking use included cigarettes. He has never been exposed to tobacco smoke. He has never used smokeless tobacco. He reports current alcohol use of about 2.0 - 3.0 standard drinks of alcohol per week. He reports current drug use. Drug: Marijuana.    Review of Systems  Review of Systems   Neurological:  Positive for tremors.   All other systems reviewed and are negative.                                 Objective    Vitals:    05/07/25 1609   BP: 134/86   BP Location: Left arm   Patient Position: Sitting   BP Cuff Size: Large adult   Pulse: 105   Resp: 16   Temp: 37.3 °C (99.2 °F)   TempSrc: Oral   SpO2: 98%   Weight: 98.9 kg (218 lb)   Height: 1.727 m (5' 8\")     No LMP for male patient.    Physical Exam  Vitals and nursing note reviewed.   Constitutional:       Appearance: Normal appearance.   HENT:      Head: Normocephalic.      Nose: Nose normal.      Mouth/Throat:      Mouth: Mucous membranes are moist.      Pharynx: Oropharynx is clear.   Eyes:      Extraocular Movements: Extraocular movements intact.      Pupils: Pupils are equal, round, and reactive to light.   Cardiovascular:      Rate and Rhythm: Normal rate and regular rhythm.      Pulses: Normal pulses.      Heart sounds: Normal heart sounds.   Pulmonary:      Effort: Pulmonary effort is normal.      Breath sounds: Normal breath sounds.   Musculoskeletal:         General: Normal range of motion.      Cervical back: Normal range of motion and neck supple. "   Skin:     General: Skin is warm and dry.   Neurological:      General: No focal deficit present.      Mental Status: He is alert and oriented to person, place, and time.   Psychiatric:         Mood and Affect: Mood normal.         Behavior: Behavior normal.         Procedures    Point of Care Test & Imaging Results from this visit  No results found for this visit on 25.   Imaging  No results found.    Cardiology, Vascular, and Other Imaging  No other imaging results found for the past 2 days      Diagnostic study results (if any) were reviewed by ROSSY Segovia.    Assessment/Plan   Allergies, medications, history, and pertinent labs/EKGs/Imaging reviewed by ROSSY Segovia.     Medical Decision Making  Derrick Forbes is a 29 y.o. male with PMH of HIV on bikarvy presents with numbness and tingling to b/l sides and some numbness, but resided, has anxiety about it. It happened going to work after he smoked two hits of marijuana, but he did not eat anything today.  PE is benign, VSS with low grade fever but states he does not feel ill or exposure  Event subsided with no chest pain or dyspnea, so advised to err on caution and monitor s/s for the night and if it happens again, he would have to go to ER for BW to evaluate his electrolytes  Advised to eat and drink clear fluids  F/u PCP 1-3 days  Stress reducing techniques since he is in the process of moving and mother day, very close to his mother who  3 years ago and mothers day is near and triggers his anxiety  Work note provided    Follow up Care: Pt instructed to follow-up with PCP or other appropriate clinician within 24 to 48 hours. Report to ED if there is any development in worsening pain, difficulty swallowing, change in phonation, fever, chills, neck pain, photophobia, headache, neck stiffness, chest pain, abdominal pain, vomiting, syncope, hemoptysis, leg swelling SOB, fever, facial swelling, eye pain, periorbital  swelling/erythema, or any new signs or sx.     The patient was educated regarding diagnosis, supportive care, OTC and Rx medications. The patient was given the opportunity to ask questions prior to discharge. They verbalized understanding of my discussion of the plans for treatment, expected course, indications to return to  or seek further evaluation in ED, and the need for timely follow up as directed.       Orders and Diagnoses  Diagnoses and all orders for this visit:  Asymptomatic HIV infection, with no history of HIV-related illness (Multi)  Tingling sensation  Anxiety      Medical Admin Record      Patient disposition: Home    Electronically signed by ROSSY Segovia  4:59 PM           [1] (Not in a hospital admission)   [2] History reviewed. No pertinent past medical history.  [3] History reviewed. No pertinent surgical history.

## 2025-05-19 DIAGNOSIS — B20 HUMAN IMMUNODEFICIENCY VIRUS (MULTI): ICD-10-CM

## 2025-05-20 ENCOUNTER — OFFICE VISIT (OUTPATIENT)
Dept: URGENT CARE | Age: 30
End: 2025-05-20
Payer: COMMERCIAL

## 2025-05-20 VITALS
BODY MASS INDEX: 31.83 KG/M2 | SYSTOLIC BLOOD PRESSURE: 142 MMHG | RESPIRATION RATE: 18 BRPM | WEIGHT: 210 LBS | HEIGHT: 68 IN | OXYGEN SATURATION: 99 % | TEMPERATURE: 98.5 F | DIASTOLIC BLOOD PRESSURE: 90 MMHG

## 2025-05-20 DIAGNOSIS — F41.9 ANXIETY: Primary | ICD-10-CM

## 2025-05-20 PROCEDURE — 99214 OFFICE O/P EST MOD 30 MIN: CPT | Performed by: HOSPITALIST

## 2025-05-20 PROCEDURE — 3080F DIAST BP >= 90 MM HG: CPT | Performed by: HOSPITALIST

## 2025-05-20 PROCEDURE — 3077F SYST BP >= 140 MM HG: CPT | Performed by: HOSPITALIST

## 2025-05-20 PROCEDURE — 3008F BODY MASS INDEX DOCD: CPT | Performed by: HOSPITALIST

## 2025-05-20 PROCEDURE — 4274F FLU IMMUNO ADMIND RCVD: CPT | Performed by: HOSPITALIST

## 2025-05-20 RX ORDER — BICTEGRAVIR SODIUM, EMTRICITABINE, AND TENOFOVIR ALAFENAMIDE FUMARATE 50; 200; 25 MG/1; MG/1; MG/1
1 TABLET ORAL DAILY
Qty: 30 TABLET | Refills: 3 | Status: SHIPPED | OUTPATIENT
Start: 2025-05-20

## 2025-05-20 ASSESSMENT — ENCOUNTER SYMPTOMS
RESPIRATORY NEGATIVE: 1
CONSTITUTIONAL NEGATIVE: 1
NERVOUS/ANXIOUS: 1

## 2025-05-20 NOTE — LETTER
May 20, 2025     Patient: Derrick Forbes   YOB: 1995   Date of Visit: 5/20/2025       To Whom It May Concern:    Derrick Forbes was seen in my clinic on 5/20/2025 at 2:00 pm. Please excuse Derrick for his absence from work on this day to make the appointment. Derrick can return to work on 05/21/2025.     If you have any questions or concerns, please don't hesitate to call.         Sincerely,         Destiny Kong MD        CC: No Recipients

## 2025-05-20 NOTE — PROGRESS NOTES
"Subjective   Patient ID: Derrick Forbes is a 29 y.o. male. They present today with a chief complaint of Anxiety (Pt states he's been on 'high alert' and uneasy for the past week).    History of Present Illness  Pt is a 29 year old who is hiv positive who has been having increased anxiety and some chest discomfort over the past week. He denies any activity change. He has a histroy of depression      Anxiety      Past Medical History  Allergies as of 05/20/2025    (No Known Allergies)       Prescriptions Prior to Admission[1]     Medical History[2]    Surgical History[3]     reports that he has quit smoking. His smoking use included cigarettes. He has never been exposed to tobacco smoke. He has never used smokeless tobacco. He reports current alcohol use of about 2.0 - 3.0 standard drinks of alcohol per week. He reports current drug use. Drug: Marijuana.    Review of Systems  Review of Systems   Constitutional: Negative.    HENT: Negative.     Respiratory: Negative.     Psychiatric/Behavioral:  The patient is nervous/anxious.                                   Objective    Vitals:    05/20/25 1404   BP: 142/90   Resp: 18   Temp: 36.9 °C (98.5 °F)   SpO2: 99%   Weight: 95.3 kg (210 lb)   Height: 1.727 m (5' 8\")     No LMP for male patient.    Physical Exam  Constitutional:       Appearance: Normal appearance.   HENT:      Head: Normocephalic.   Cardiovascular:      Rate and Rhythm: Normal rate and regular rhythm.   Pulmonary:      Effort: Pulmonary effort is normal.      Breath sounds: Normal breath sounds.   Neurological:      Mental Status: He is alert.         ECG 12 lead (Clinic Performed)    Date/Time: 5/20/2025 3:10 PM    Performed by: Destiny Kong MD  Authorized by: Destiny Kong MD    ECG interpreted by ED Physician in the absence of a cardiologist: yes    Previous ECG:     Previous ECG:  Unavailable  Interpretation:     Interpretation: normal        Point of Care Test & Imaging Results from this visit  No " results found for this visit on 05/20/25.   Imaging  No results found.    Cardiology, Vascular, and Other Imaging  No other imaging results found for the past 2 days      Diagnostic study results (if any) were reviewed by Destiny Kong MD.    Assessment/Plan   Allergies, medications, history, and pertinent labs/EKGs/Imaging reviewed by Destiny Kong MD.     Medical Decision Making  Pt with nl EKG   Probable anxiey and or depression beh health referral placed. He does not want treatment at this time.   Discussed other means of calming patient and will try to avoid alcohol and marijuana    Orders and Diagnoses  Diagnoses and all orders for this visit:  Anxiety  -     Referral to [facility]; Future  -     ECG 12 lead (Clinic Performed)      Medical Admin Record      Patient disposition: Home    Electronically signed by Destiny Kong MD  3:08 PM           [1] (Not in a hospital admission)  [2] No past medical history on file.  [3] No past surgical history on file.

## 2025-05-29 ENCOUNTER — TELEPHONE (OUTPATIENT)
Dept: INFECTIOUS DISEASES | Facility: HOSPITAL | Age: 30
End: 2025-05-29
Payer: COMMERCIAL

## 2025-05-29 NOTE — TELEPHONE ENCOUNTER
Hung attempted to reach pt to check in and update CM paperwork.  Hung LM for pt.     Hung emailed pt with upcoming appt date - 6/24.    Hung to meet with pt at appt to complete CM updates.

## 2025-06-04 ENCOUNTER — APPOINTMENT (OUTPATIENT)
Dept: PRIMARY CARE | Facility: CLINIC | Age: 30
End: 2025-06-04
Payer: COMMERCIAL

## 2025-06-04 VITALS
SYSTOLIC BLOOD PRESSURE: 141 MMHG | TEMPERATURE: 97.4 F | BODY MASS INDEX: 33.59 KG/M2 | WEIGHT: 221.6 LBS | HEIGHT: 68 IN | DIASTOLIC BLOOD PRESSURE: 89 MMHG | OXYGEN SATURATION: 99 % | HEART RATE: 114 BPM

## 2025-06-04 DIAGNOSIS — E66.9 OBESITY WITH BODY MASS INDEX (BMI) OF 30.0 TO 39.9: ICD-10-CM

## 2025-06-04 DIAGNOSIS — F41.1 GENERALIZED ANXIETY DISORDER: ICD-10-CM

## 2025-06-04 DIAGNOSIS — Z00.00 ROUTINE GENERAL MEDICAL EXAMINATION AT A HEALTH CARE FACILITY: ICD-10-CM

## 2025-06-04 DIAGNOSIS — R73.9 HYPERGLYCEMIA: ICD-10-CM

## 2025-06-04 DIAGNOSIS — B20 CURRENTLY ASYMPTOMATIC HIV INFECTION, WITH HISTORY OF HIV-RELATED ILLNESS (MULTI): ICD-10-CM

## 2025-06-04 DIAGNOSIS — I10 PRIMARY HYPERTENSION: Primary | ICD-10-CM

## 2025-06-04 DIAGNOSIS — E78.5 DYSLIPIDEMIA: ICD-10-CM

## 2025-06-04 PROCEDURE — 3079F DIAST BP 80-89 MM HG: CPT | Performed by: STUDENT IN AN ORGANIZED HEALTH CARE EDUCATION/TRAINING PROGRAM

## 2025-06-04 PROCEDURE — 3008F BODY MASS INDEX DOCD: CPT | Performed by: STUDENT IN AN ORGANIZED HEALTH CARE EDUCATION/TRAINING PROGRAM

## 2025-06-04 PROCEDURE — 99204 OFFICE O/P NEW MOD 45 MIN: CPT | Performed by: STUDENT IN AN ORGANIZED HEALTH CARE EDUCATION/TRAINING PROGRAM

## 2025-06-04 PROCEDURE — 4274F FLU IMMUNO ADMIND RCVD: CPT | Performed by: STUDENT IN AN ORGANIZED HEALTH CARE EDUCATION/TRAINING PROGRAM

## 2025-06-04 PROCEDURE — 3077F SYST BP >= 140 MM HG: CPT | Performed by: STUDENT IN AN ORGANIZED HEALTH CARE EDUCATION/TRAINING PROGRAM

## 2025-06-04 RX ORDER — ESCITALOPRAM OXALATE 10 MG/1
10 TABLET ORAL DAILY
Qty: 90 TABLET | Refills: 3 | Status: SHIPPED | OUTPATIENT
Start: 2025-06-04

## 2025-06-04 RX ORDER — LOSARTAN POTASSIUM 50 MG/1
50 TABLET ORAL DAILY
Qty: 90 TABLET | Refills: 3 | Status: SHIPPED | OUTPATIENT
Start: 2025-06-04

## 2025-06-04 ASSESSMENT — ENCOUNTER SYMPTOMS
LIGHT-HEADEDNESS: 0
HEMATURIA: 0
FEVER: 0
DEPRESSION: 0
NERVOUS/ANXIOUS: 1
SHORTNESS OF BREATH: 0
EYE PAIN: 0
LOSS OF SENSATION IN FEET: 0
ABDOMINAL PAIN: 0
DIZZINESS: 0
UNEXPECTED WEIGHT CHANGE: 0
CHILLS: 0
OCCASIONAL FEELINGS OF UNSTEADINESS: 0
RHINORRHEA: 0

## 2025-06-04 ASSESSMENT — ANXIETY QUESTIONNAIRES
3. WORRYING TOO MUCH ABOUT DIFFERENT THINGS: SEVERAL DAYS
7. FEELING AFRAID AS IF SOMETHING AWFUL MIGHT HAPPEN: SEVERAL DAYS
2. NOT BEING ABLE TO STOP OR CONTROL WORRYING: SEVERAL DAYS
IF YOU CHECKED OFF ANY PROBLEMS ON THIS QUESTIONNAIRE, HOW DIFFICULT HAVE THESE PROBLEMS MADE IT FOR YOU TO DO YOUR WORK, TAKE CARE OF THINGS AT HOME, OR GET ALONG WITH OTHER PEOPLE: SOMEWHAT DIFFICULT
4. TROUBLE RELAXING: SEVERAL DAYS
5. BEING SO RESTLESS THAT IT IS HARD TO SIT STILL: SEVERAL DAYS
6. BECOMING EASILY ANNOYED OR IRRITABLE: NEARLY EVERY DAY
GAD7 TOTAL SCORE: 9
1. FEELING NERVOUS, ANXIOUS, OR ON EDGE: SEVERAL DAYS

## 2025-06-04 ASSESSMENT — PATIENT HEALTH QUESTIONNAIRE - PHQ9
1. LITTLE INTEREST OR PLEASURE IN DOING THINGS: NOT AT ALL
2. FEELING DOWN, DEPRESSED OR HOPELESS: SEVERAL DAYS
SUM OF ALL RESPONSES TO PHQ9 QUESTIONS 1 AND 2: 1

## 2025-06-04 NOTE — ASSESSMENT & PLAN NOTE
-I reviewed outside ID notes and diagnostic studies.  -Continue Biktarvy therapy.  -Will continue to monitor for drug drug interactions and medication side effects.

## 2025-06-04 NOTE — ASSESSMENT & PLAN NOTE
-HERVE-7 Total Score: 9 (6/4/2025  8:31 AM)   -Referral to psychology placed.   -6/4/2025: Started Lexapro 10mg daily. Medication side-effects reviewed with patient.

## 2025-06-04 NOTE — ASSESSMENT & PLAN NOTE
-6/4/2025: Weight 221 lbs 9.6 oz (BMI 33.69).  -Patient and I discussed dietary and lifestyle modifications and goals.  -Will continue to monitor for complications of obesity.  -Will continue to monitor lipid panel and A1C.

## 2025-06-04 NOTE — PROGRESS NOTES
"Subjective     Patient ID: Derrick Forbes is a 29 y.o. male     Initial PCP History 6/4/2025:  Patient is here to establish care, This is the first time I am meeting the patient.  Patient complains of anxiety and hypertension. Mother passed away in 2021. Then his godmother and grandmoteher massed away. Cousin passed away in June. He just moved and started a new job. He works in [employer]. His boss, who he liked, is about to leave. He starts work at 2PM.     Tobacco/Alcohol/Drug Use:   Social History     Tobacco Use    Smoking status: Former     Types: Cigarettes     Passive exposure: Never    Smokeless tobacco: Never    Tobacco comments:     Black and mild    Substance Use Topics    Alcohol use: Yes     Alcohol/week: 2.0 - 3.0 standard drinks of alcohol     Types: 2 - 3 Standard drinks or equivalent per week     Comment: MODERATELY       Required Screenings/Immunizations:   Health Maintenance Due   Topic Date Due    Yearly Adult Physical  Never done    COVID-19 Vaccine (1) Never done    Meningococcal Vaccine (2 - Risk 2-dose series) 09/15/2014    Zoster Vaccines (1 of 2) 08/19/2016       Problems to be addressed today in addition to Preventative Services: As stated in orders.     Review of Systems   Constitutional:  Negative for chills, fever and unexpected weight change.   HENT:  Negative for rhinorrhea.    Eyes:  Negative for pain.   Respiratory:  Negative for shortness of breath.    Cardiovascular:  Negative for chest pain.   Gastrointestinal:  Negative for abdominal pain.   Genitourinary:  Negative for hematuria.   Skin:  Negative for rash.   Neurological:  Negative for dizziness, syncope and light-headedness.   Psychiatric/Behavioral:  Negative for behavioral problems, self-injury and suicidal ideas. The patient is nervous/anxious.        /89 (BP Location: Left arm, Patient Position: Sitting, BP Cuff Size: Adult)   Pulse (!) 114   Temp 36.3 °C (97.4 °F) (Temporal)   Ht 1.727 m (5' 8\")   " "Wt 101 kg (221 lb 9.6 oz)   SpO2 99%   BMI 33.69 kg/m²      Objective   Physical Exam  Vitals and nursing note reviewed.   Constitutional:       General: He is not in acute distress.     Appearance: He is obese.   Cardiovascular:      Rate and Rhythm: Normal rate.   Skin:     General: Skin is warm and dry.   Neurological:      Mental Status: He is alert.      Gait: Gait normal.   Psychiatric:         Behavior: Behavior normal.           Labs:   Lab Results   Component Value Date    WBC 6.3 02/18/2025    HGB 13.6 02/18/2025    HCT 42.4 02/18/2025     02/18/2025    TSH 1.87 08/16/2021     Lab Results   Component Value Date     02/18/2025    K 4.0 02/18/2025     02/18/2025    BUN 12 02/18/2025    CREATININE 1.22 02/18/2025    GLUCOSE 77 02/18/2025    CALCIUM 9.2 02/18/2025    PROT 7.0 02/18/2025    BILITOT 0.4 02/18/2025    ALKPHOS 73 02/18/2025    AST 15 02/18/2025    ALT 17 02/18/2025     Lab Results   Component Value Date    CHOL 238 (H) 02/18/2025    CHOL 207 (H) 05/23/2024    CHOL 218 (H) 04/27/2023      Lab Results   Component Value Date    TRIG 130 02/18/2025    TRIG 245 (H) 05/23/2024    TRIG 143 04/27/2023      Lab Results   Component Value Date    HDL 47.5 02/18/2025    HDL 46.1 05/23/2024    HDL 47.6 04/27/2023     Lab Results   Component Value Date    LDLCALC 165 (H) 02/18/2025    LDLCALC 112 (H) 05/23/2024      Lab Results   Component Value Date    VLDL 26 02/18/2025    VLDL 49 (H) 05/23/2024    VLDL 29 04/27/2023    No components found for: \"CHOLHDLRATI0\"    Imaging/Testing: XR hand right 3+ views  Narrative: Interpreted By:  Bonifacio Solorio,   STUDY:  XR HAND RIGHT 3+ VIEWS; ;  6/24/2024 2:53 pm      INDICATION:  Signs/Symptoms:fracture.      COMPARISON:  05/29/2024      ACCESSION NUMBER(S):  [id number]      ORDERING CLINICIAN:  NUHA MOYER      FINDINGS:  Right hand, three views      Subacute healing fracture through the base of the 2nd metacarpal with  intra-articular " extension. Increased sclerosis. No change from the  prior      Impression: Further healing of 2nd metacarpal base fracture. No malalignment      MACRO:  None      Signed by: Bonifacio Solorio 6/25/2024 5:54 PM  Dictation workstation:   [id number]      Problem List Items Addressed This Visit          Medium    HIV infection    Current Assessment & Plan   -I reviewed outside ID notes and diagnostic studies.  -Continue Biktarvy therapy.  -Will continue to monitor for drug drug interactions and medication side effects.         Hypertension - Primary    Current Assessment & Plan   -BP Goal <130//90  -6/4/2025: Increased Losartan from 25mg to 50mg daily.   -Hold Blood Pressure Medications if SBP <90 mmHg or HR <60 bpm.  -Patient and I discussed dietary and lifestyle modifications, including DASH diet.  -Will continue to monitor for complications of HTN.  -Will continue to monitor for medication adverse effects.            Relevant Medications    losartan (Cozaar) 50 mg tablet    Other Relevant Orders    Albumin-Creatinine Ratio, Urine Random    TSH with reflex to Free T4 if abnormal    Dyslipidemia    Current Assessment & Plan   -LDL Goal <100.  -Patient is above goal.  -We discussed dietary and lifestyle modifications.  -Continue to monitor lipid panel.          Relevant Orders    Lipid panel    Generalized anxiety disorder    Current Assessment & Plan   -HERVE-7 Total Score: 9 (6/4/2025  8:31 AM)   -Referral to psychology placed.   -6/4/2025: Started Lexapro 10mg daily. Medication side-effects reviewed with patient.          Relevant Medications    escitalopram (Lexapro) 10 mg tablet    Other Relevant Orders    Referral to Psychology    Obesity with body mass index (BMI) of 30.0 to 39.9    Current Assessment & Plan   -6/4/2025: Weight 221 lbs 9.6 oz (BMI 33.69).  -Patient and I discussed dietary and lifestyle modifications and goals.  -Will continue to monitor for complications of obesity.  -Will continue to monitor  lipid panel and A1C.            Other Visit Diagnoses         Routine general medical examination at a health care facility          Hyperglycemia        Relevant Orders    Hemoglobin A1C              As part of today's Preventative Visit, an age and gender-appropriate history and physical was performed, as documented below. Counseling and anticipatory guidance were performed, and risk factor reduction interventions (including [place] Preventative Services Task Force recommended screening tests) were utilized/ordered as outlined in the above Assessment and Plan. All patient medications were reviewed, and refilled if necessary.    Patient and I discussed diet/nutrition, lifestyle modifications, safety, medication indications and side effects, and health goals.    current treatment plan is effective, no change in therapy, orders and follow up as documented in EMR, lab results reviewed with patient, repeat labs ordered prior to next appointment, reviewed compliance with lifestyle measures, reviewed diet, exercise and weight control, reviewed medications and side effects in detail           I have reviewed OARRS report, consistent with prescribed medication. I have considered risks of abuse, diversion, side effects and feel clinically benefit of medication outweighs risks at this time.

## 2025-06-04 NOTE — ASSESSMENT & PLAN NOTE
-LDL Goal <100.  -Patient is above goal.  -We discussed dietary and lifestyle modifications.  -Continue to monitor lipid panel.

## 2025-06-04 NOTE — ASSESSMENT & PLAN NOTE
-BP Goal <130//90  -6/4/2025: Increased Losartan from 25mg to 50mg daily.   -Hold Blood Pressure Medications if SBP <90 mmHg or HR <60 bpm.  -Patient and I discussed dietary and lifestyle modifications, including DASH diet.  -Will continue to monitor for complications of HTN.  -Will continue to monitor for medication adverse effects.

## 2025-06-05 LAB
ALBUMIN/CREAT UR: 1 MG/G CREAT
CHOLEST SERPL-MCNC: 203 MG/DL
CHOLEST/HDLC SERPL: 4 (CALC)
CREAT UR-MCNC: 184 MG/DL (ref 20–320)
EST. AVERAGE GLUCOSE BLD GHB EST-MCNC: 114 MG/DL
EST. AVERAGE GLUCOSE BLD GHB EST-SCNC: 6.3 MMOL/L
HBA1C MFR BLD: 5.6 %
HDLC SERPL-MCNC: 51 MG/DL
LDLC SERPL CALC-MCNC: 123 MG/DL (CALC)
MICROALBUMIN UR-MCNC: 0.2 MG/DL
NONHDLC SERPL-MCNC: 152 MG/DL (CALC)
TRIGL SERPL-MCNC: 172 MG/DL
TSH SERPL-ACNC: 2.56 MIU/L (ref 0.4–4.5)

## 2025-06-11 ENCOUNTER — TELEPHONE (OUTPATIENT)
Dept: INFECTIOUS DISEASES | Facility: HOSPITAL | Age: 30
End: 2025-06-11
Payer: COMMERCIAL

## 2025-06-11 ENCOUNTER — OFFICE VISIT (OUTPATIENT)
Dept: URGENT CARE | Age: 30
End: 2025-06-11
Payer: COMMERCIAL

## 2025-06-11 VITALS
RESPIRATION RATE: 20 BRPM | DIASTOLIC BLOOD PRESSURE: 101 MMHG | BODY MASS INDEX: 33.6 KG/M2 | OXYGEN SATURATION: 98 % | WEIGHT: 221 LBS | SYSTOLIC BLOOD PRESSURE: 140 MMHG | HEART RATE: 111 BPM | TEMPERATURE: 98.3 F

## 2025-06-11 DIAGNOSIS — S39.012D BACK STRAIN, SUBSEQUENT ENCOUNTER: Primary | ICD-10-CM

## 2025-06-11 DIAGNOSIS — M54.50 RIGHT-SIDED LOW BACK PAIN WITHOUT SCIATICA, UNSPECIFIED CHRONICITY: ICD-10-CM

## 2025-06-11 DIAGNOSIS — F41.9 ANXIETY: ICD-10-CM

## 2025-06-11 ASSESSMENT — PAIN SCALES - GENERAL: PAINLEVEL_OUTOF10: 2

## 2025-06-11 ASSESSMENT — PATIENT HEALTH QUESTIONNAIRE - PHQ9
2. FEELING DOWN, DEPRESSED OR HOPELESS: NOT AT ALL
1. LITTLE INTEREST OR PLEASURE IN DOING THINGS: NOT AT ALL
SUM OF ALL RESPONSES TO PHQ9 QUESTIONS 1 AND 2: 0

## 2025-06-11 NOTE — TELEPHONE ENCOUNTER
Sw received call back from pt.   Pt doing well - established a PCP and working on improving diet.  Pt expressed concern about slipped disc - cited nerve pain.  Pt headed to urgent care now to assess.  Pt confirms he will attend June 24 appt.  Sw to meet with pt at that appt and update CM paperwork.  Pt reports medication adherence.  Pt has no other questions for sw at this time.    Time: 15

## 2025-06-11 NOTE — PROGRESS NOTES
Subjective   Patient ID: Derrick Forbes is a 29 y.o. male. They present today with a chief complaint of Back Pain (Lower back pain feels like muscle contractions, numbness goes from back to shoulders x1 month).    History of Present Illness  Patient is here for follow up of back pain. He had an injury at the end of April from dead lifting. He came here and was given muscle relaxers and a medrol dose pack which really helped. He is still having some residual pain mostly with movement. No new injuries or trauma. No bladder or bowel incontinence. No radiation of pain down the legs. No saddle anesthesia.     He reports sometimes getting numbness/tingling in his upper extremities. This usually resolves very quickly. He thinks this is mostly related to anxiety he's been battling with. He reports that he has lost many family members over the last few years which has been very hard for him.    Needs a work note today.        Past Medical History  Allergies as of 06/11/2025    (No Known Allergies)       Prescriptions Prior to Admission[1]     Medical History[2]    Surgical History[3]     reports that he has quit smoking. His smoking use included cigarettes. He has never been exposed to tobacco smoke. He has never used smokeless tobacco. He reports current alcohol use of about 2.0 - 3.0 standard drinks of alcohol per week. He reports current drug use. Drug: Marijuana.    Review of Systems  Pertinent items listed in HPI      Objective    Vitals:    06/11/25 1422 06/11/25 1423   BP: 153/87 (!) 140/101   Pulse: (!) 111    Resp: 20    Temp: 36.8 °C (98.3 °F)    SpO2: 98%    Weight: 100 kg (221 lb)      No LMP for male patient.    Physical Exam  Constitutional:       Appearance: Normal appearance.   HENT:      Head: Normocephalic and atraumatic.   Eyes:      Conjunctiva/sclera: Conjunctivae normal.   Cardiovascular:      Rate and Rhythm: Regular rhythm. Tachycardia present.   Pulmonary:      Effort: Pulmonary effort is normal.    Musculoskeletal:      Cervical back: Normal range of motion.      Comments: + TTP of right lumbar paraspinal muscles. Full strength and ROM of upper and lower extremities bilaterally.   Skin:     General: Skin is warm and dry.      Findings: No rash.   Neurological:      General: No focal deficit present.      Mental Status: He is alert and oriented to person, place, and time.   Psychiatric:         Mood and Affect: Mood normal.         Behavior: Behavior normal.         Procedures    Point of Care Test & Imaging Results from this visit  No results found for this visit on 06/11/25.   Imaging  No results found.    Cardiology, Vascular, and Other Imaging  No other imaging results found for the past 2 days      Diagnostic study results (if any) were reviewed by Janice Da Silva PA-C.    Assessment/Plan   Allergies, medications, history, and pertinent labs/EKGs/Imaging reviewed by Janice Da Silva PA-C.     Medical Decision Making  Back pain overall better since visit on 4/26, but still lingering   Discussed Ibuprofen or Tylenol as needed for back pain, stretching  Discussed he would probably benefit from Physical therapy at this point    Recommend close follow up with PCP as well for anxiety management     At time of discharge patient was clinically well-appearing and HDS for outpatient management. The patient and/or family was educated regarding diagnosis, supportive care, OTC and Rx medications. The patient and/or family was given the opportunity to ask questions prior to discharge.  They verbalized understanding of my discussion of the plans for treatment, expected course, indications to return to  or seek further evaluation in ED, and the need for timely follow up as directed. They were provided with a work/school excuse if requested.          Orders and Diagnoses  Diagnoses and all orders for this visit:  Back strain, subsequent encounter  Right-sided low back pain without sciatica, unspecified  chronicity  Anxiety      Return to Urgent care if symptoms return or progress  Follow up with PCP in 1-2 weeks         Patient disposition: Home    Electronically signed by Janice Da Silva PA-C  4:29 PM           [1] (Not in a hospital admission)   [2] No past medical history on file.  [3] History reviewed. No pertinent surgical history.

## 2025-06-24 ENCOUNTER — TELEPHONE (OUTPATIENT)
Dept: INFECTIOUS DISEASES | Facility: HOSPITAL | Age: 30
End: 2025-06-24
Payer: COMMERCIAL

## 2025-06-24 ENCOUNTER — APPOINTMENT (OUTPATIENT)
Dept: IMMUNOLOGY | Facility: CLINIC | Age: 30
End: 2025-06-24

## 2025-06-24 NOTE — TELEPHONE ENCOUNTER
Sw contacted pt re: missed appt today.  Pt said he forgot about appt and wants to reschedule.  Pt working with PCP for anxiety.  Pt also reports medication adherence - only missed yesterday's dose out of last month.  Sw to keep pt in EIS at this time until pt comes back in to see MD.  Pt has no other questions for sw at this time.    Time: 15  EIS  At risk      [facility] Case Management Inactivation      Date: 6/24    Reason for inactivation: documentation past due      [x]  I have discussed with patient.  Patient aware of inactivation.          []  I have not attempted to contact client because they indicated that they did not want to be contacted.        []  I have attempted to contact the client to conduct case management activities.  The client has not responded.  I attempted:           []  Phone call         []  Mail outreach       [x]  I have referred patient to EIS to try to re-engage in care. - sw to continue to engage as EIS until pt comes back to see MD.      At this time this client will now be inactive in case management.

## 2025-06-27 ENCOUNTER — TELEPHONE (OUTPATIENT)
Dept: IMMUNOLOGY | Facility: CLINIC | Age: 30
End: 2025-06-27
Payer: COMMERCIAL

## 2025-07-11 ENCOUNTER — APPOINTMENT (OUTPATIENT)
Dept: BEHAVIORAL HEALTH | Facility: CLINIC | Age: 30
End: 2025-07-11
Payer: COMMERCIAL

## 2025-07-15 DIAGNOSIS — B20 HUMAN IMMUNODEFICIENCY VIRUS (MULTI): ICD-10-CM

## 2025-07-15 RX ORDER — BICTEGRAVIR SODIUM, EMTRICITABINE, AND TENOFOVIR ALAFENAMIDE FUMARATE 50; 200; 25 MG/1; MG/1; MG/1
1 TABLET ORAL DAILY
Qty: 30 TABLET | Refills: 3 | Status: SHIPPED | OUTPATIENT
Start: 2025-07-15 | End: 2025-07-22 | Stop reason: SDUPTHER

## 2025-07-16 ENCOUNTER — APPOINTMENT (OUTPATIENT)
Dept: PRIMARY CARE | Facility: CLINIC | Age: 30
End: 2025-07-16
Payer: COMMERCIAL

## 2025-07-22 ENCOUNTER — OFFICE VISIT (OUTPATIENT)
Dept: IMMUNOLOGY | Facility: CLINIC | Age: 30
End: 2025-07-22
Payer: COMMERCIAL

## 2025-07-22 ENCOUNTER — SOCIAL WORK (OUTPATIENT)
Dept: INFECTIOUS DISEASES | Facility: HOSPITAL | Age: 30
End: 2025-07-22
Payer: COMMERCIAL

## 2025-07-22 VITALS
RESPIRATION RATE: 18 BRPM | DIASTOLIC BLOOD PRESSURE: 97 MMHG | OXYGEN SATURATION: 100 % | BODY MASS INDEX: 34.82 KG/M2 | WEIGHT: 229 LBS | SYSTOLIC BLOOD PRESSURE: 154 MMHG | HEART RATE: 86 BPM

## 2025-07-22 DIAGNOSIS — B20 CURRENTLY ASYMPTOMATIC HIV INFECTION, WITH HISTORY OF HIV-RELATED ILLNESS (MULTI): Primary | ICD-10-CM

## 2025-07-22 DIAGNOSIS — B20 HUMAN IMMUNODEFICIENCY VIRUS (MULTI): ICD-10-CM

## 2025-07-22 DIAGNOSIS — I10 PRIMARY HYPERTENSION: ICD-10-CM

## 2025-07-22 DIAGNOSIS — F41.1 GENERALIZED ANXIETY DISORDER: ICD-10-CM

## 2025-07-22 LAB
ALBUMIN SERPL BCP-MCNC: 4.4 G/DL (ref 3.4–5)
ALP SERPL-CCNC: 96 U/L (ref 33–120)
ALT SERPL W P-5'-P-CCNC: 123 U/L (ref 10–52)
ANION GAP SERPL CALC-SCNC: 13 MMOL/L (ref 10–20)
AST SERPL W P-5'-P-CCNC: 64 U/L (ref 9–39)
BASOPHILS # BLD AUTO: 0.06 X10*3/UL (ref 0–0.1)
BASOPHILS NFR BLD AUTO: 0.8 %
BILIRUB SERPL-MCNC: 1.3 MG/DL (ref 0–1.2)
BUN SERPL-MCNC: 11 MG/DL (ref 6–23)
CALCIUM SERPL-MCNC: 9.4 MG/DL (ref 8.6–10.6)
CD3+CD4+ CELLS # BLD: 1.04 X10E9/L (ref 0.35–2.74)
CD3+CD4+ CELLS # BLD: 1037 /MM3 (ref 350–2740)
CD3+CD4+ CELLS NFR BLD: 41 % (ref 29–57)
CD3+CD4+ CELLS/CD3+CD8+ CLL BLD: 1.14 % (ref 1–3.5)
CD3+CD8+ CELLS # BLD: 0.91 X10E9/L (ref 0.08–1.49)
CD3+CD8+ CELLS NFR BLD: 36 % (ref 7–31)
CHLORIDE SERPL-SCNC: 100 MMOL/L (ref 98–107)
CO2 SERPL-SCNC: 29 MMOL/L (ref 21–32)
CREAT SERPL-MCNC: 0.97 MG/DL (ref 0.5–1.3)
EGFRCR SERPLBLD CKD-EPI 2021: >90 ML/MIN/1.73M*2
EOSINOPHIL # BLD AUTO: 0.1 X10*3/UL (ref 0–0.7)
EOSINOPHIL NFR BLD AUTO: 1.3 %
ERYTHROCYTE [DISTWIDTH] IN BLOOD BY AUTOMATED COUNT: 12.2 % (ref 11.5–14.5)
GLUCOSE SERPL-MCNC: 85 MG/DL (ref 74–99)
HCT VFR BLD AUTO: 45.2 % (ref 41–52)
HGB BLD-MCNC: 14.9 G/DL (ref 13.5–17.5)
IMM GRANULOCYTES # BLD AUTO: 0.02 X10*3/UL (ref 0–0.7)
IMM GRANULOCYTES NFR BLD AUTO: 0.3 % (ref 0–0.9)
LYMPHOCYTES # BLD AUTO: 2.53 X10*3/UL (ref 1.2–4.8)
LYMPHOCYTES # SPEC AUTO: 2.53 X10*3/UL
LYMPHOCYTES NFR BLD AUTO: 33.1 %
MCH RBC QN AUTO: 31.5 PG (ref 26–34)
MCHC RBC AUTO-ENTMCNC: 33 G/DL (ref 32–36)
MCV RBC AUTO: 96 FL (ref 80–100)
MONOCYTES # BLD AUTO: 0.79 X10*3/UL (ref 0.1–1)
MONOCYTES NFR BLD AUTO: 10.3 %
NEUTROPHILS # BLD AUTO: 4.15 X10*3/UL (ref 1.2–7.7)
NEUTROPHILS NFR BLD AUTO: 54.2 %
NRBC BLD-RTO: 0 /100 WBCS (ref 0–0)
PLATELET # BLD AUTO: 305 X10*3/UL (ref 150–450)
POTASSIUM SERPL-SCNC: 3.8 MMOL/L (ref 3.5–5.3)
PROT SERPL-MCNC: 7.2 G/DL (ref 6.4–8.2)
RBC # BLD AUTO: 4.73 X10*6/UL (ref 4.5–5.9)
SODIUM SERPL-SCNC: 138 MMOL/L (ref 136–145)
TREPONEMA PALLIDUM IGG+IGM AB [PRESENCE] IN SERUM OR PLASMA BY IMMUNOASSAY: NONREACTIVE
WBC # BLD AUTO: 7.7 X10*3/UL (ref 4.4–11.3)

## 2025-07-22 PROCEDURE — 99214 OFFICE O/P EST MOD 30 MIN: CPT | Mod: GC | Performed by: INTERNAL MEDICINE

## 2025-07-22 PROCEDURE — 3080F DIAST BP >= 90 MM HG: CPT | Performed by: INTERNAL MEDICINE

## 2025-07-22 PROCEDURE — 4274F FLU IMMUNO ADMIND RCVD: CPT | Performed by: INTERNAL MEDICINE

## 2025-07-22 PROCEDURE — 3077F SYST BP >= 140 MM HG: CPT | Performed by: INTERNAL MEDICINE

## 2025-07-22 PROCEDURE — 85025 COMPLETE CBC W/AUTO DIFF WBC: CPT | Performed by: INTERNAL MEDICINE

## 2025-07-22 PROCEDURE — 86780 TREPONEMA PALLIDUM: CPT | Performed by: INTERNAL MEDICINE

## 2025-07-22 PROCEDURE — 88185 FLOWCYTOMETRY/TC ADD-ON: CPT | Performed by: INTERNAL MEDICINE

## 2025-07-22 PROCEDURE — 84075 ASSAY ALKALINE PHOSPHATASE: CPT | Performed by: INTERNAL MEDICINE

## 2025-07-22 PROCEDURE — 99214 OFFICE O/P EST MOD 30 MIN: CPT | Performed by: INTERNAL MEDICINE

## 2025-07-22 PROCEDURE — 36415 COLL VENOUS BLD VENIPUNCTURE: CPT | Performed by: INTERNAL MEDICINE

## 2025-07-22 PROCEDURE — 87536 HIV-1 QUANT&REVRSE TRNSCRPJ: CPT | Performed by: INTERNAL MEDICINE

## 2025-07-22 RX ORDER — LOSARTAN POTASSIUM AND HYDROCHLOROTHIAZIDE 12.5; 5 MG/1; MG/1
1 TABLET ORAL DAILY
Qty: 30 TABLET | Refills: 11 | Status: SHIPPED | OUTPATIENT
Start: 2025-07-22 | End: 2026-07-22

## 2025-07-22 RX ORDER — BICTEGRAVIR SODIUM, EMTRICITABINE, AND TENOFOVIR ALAFENAMIDE FUMARATE 50; 200; 25 MG/1; MG/1; MG/1
1 TABLET ORAL DAILY
Qty: 30 TABLET | Refills: 5 | Status: SHIPPED | OUTPATIENT
Start: 2025-07-22

## 2025-07-22 ASSESSMENT — PAIN SCALES - GENERAL: PAINLEVEL_OUTOF10: 0-NO PAIN

## 2025-07-22 NOTE — ASSESSMENT & PLAN NOTE
Has been out of Biktarvy for past week due to reported pharmacy issues.  Prior had been adherent, last CD4 1043, VL UD.   - DOTTY (tammy) will assist with prescription issues.

## 2025-07-22 NOTE — PROGRESS NOTES
"[facility]NAGEMENT PSYCHOSOCIAL ASSESSMENT AND ISP    Sw met with pt at MD appt and completed CM re-enrollment.     Basic Needs: pt reports access to basic household necessities.  Pt reports no needs related to ADLs or IDLs.   Income / financial concerns: pt working full time and reports he is generally able to meet his expenses.   Transportation: pt has his own car and just had it repaired. Pt reports no unmet needs at this time.   Housing: pt has his own apartment.  Pt has a friend that he has been considering to take on as a roommate, but isn't sure.  Pt reports no unmet needs at this time.   Utilities: Pt reports no unmet needs at this time.   Digital Connectivity/Internet: Pt reports no unmet needs at this time.     Medical Care and Medication Adherence: pt has been without his meds for 1 week.  Pt reports issue at the pharmacy.  Sw contacted pharmacy - pt will needs to fill ART at [facility].  Sw ensured local [facility] had OHDAP information as secondary for his other meds.  Sw provided pt with number for [facility] to call and order biktarvy.  Pt will  other meds at local [facility].   Last ID appt: 07/22/2025   Next ID appt:  Visit date not found   Number of doses missed in last 7 days: 7    Mental Health: pt reports that he was having issues with anxiety.  Pt established PCP at [facility], who put him on lexapro.  Pt has not yet picked up medications.  Hung reviewed Lexapro and SSRI with pt, who plans on picking up medications and starting them.  Pt was also given information for [email or web address] counseling services and plans on following up. Sw to follow up with pt.     Substance Abuse: pt reports drinking on the weekends. Pt reports no unmet needs at this time.     Oral Health:   Last dental appt: \"over a year ago\" - pt has to re-establish with [facility] dental.   Does the patient have any dental concerns? Need for appt    Health Insurance: pt has health insurance through his work and has ohdap " for copays.  Pt approved on ohdap through 2/28/26.    Safety: Pt reports no unmet needs at this time.     Support System: Pt reports no unmet needs at this time.     Sexual Health/Risk Reduction:   Sexually Active: pt has friend that he is sexually active with.  Pt reports condom use and has shared status with friend.   Disclosure: Pt reports no unmet needs at this time.   Patient aware of and expresses understanding of felonious assault disclosure laws.  Aware that condoms are available as needed in clinic.  Patient educated on U=U.    Knowledge of HIV Disease: Pt reports no unmet needs at this time.     Legal Issues:  Pt reports no unmet needs at this time.     Acuity Score:  20    Goal:  medication adherence, start lexapro    Patient action steps:  [facility] to [facility] for [facility]. Pt [facility] and [facility]ro.  Pt will follow up with counseling resource for anxiety support.  Pt will reach out to sw with questions.     Social work action steps:  sw to assist with medication or insurance issues.  Sw to follow up re: anxiety resources to ensure pt has enough resources he needs.     Time Spent: 60

## 2025-07-22 NOTE — LETTER
07/22/25    Yoshi Irizarry MD  [address]  [address]  [facility] [address]      Dear Dr. Yoshi Irizarry MD,    I am writing to confirm that your patient, Derrick Forbes, received care in my office on 07/22/25. I have enclosed a summary of the care provided to Derrick for your reference.    Please contact me with any questions you may have regarding the visit.    Sincerely,         Dipika John MD  [address]  [place] [place] [facility] [address] [phone]    CC: No Recipients

## 2025-07-22 NOTE — ASSESSMENT & PLAN NOTE
Prescribed lexapro 10mg daily by PCP but has not yet picked it [facility] from [facility]  - Also has referral to psychology

## 2025-07-22 NOTE — PROGRESS NOTES
HIV Clinic Follow-up Visit:    Subjective   Derrick Forbes is a 29 y.o. male who presents to the [facility] for follow-up of HIV infection.     Has been out of Biktarvy for past week due to reported pharmacy issues.  Prior had been adherent, last CD4 1043, VL UD.     Since last visit followed up with PCP who increase losartan to 50 Mg daily which he started taking in June. Today /102, does not take at home.  Also prescribed Lexapro 50 Mg daily which he has not yet picked up from [facility].  Continues to endorse anxiety which he says work does not help.  Started work 1 year ago with [employer] in addition to multiple family deaths contributing.    No tobacco use, alcohol use 2 days approximately 4-6 drinks per week, uses marijuana like anabolic but no other recreational drugs.  Sexually active with 1 partner they use condoms.    Objective   Vitals:    07/22/25 1048   BP: (!) 154/97   Pulse:    Resp:    SpO2:       Current Medications[1]     Physical Exam  General: Well developed, well nourished.  HEENT: Normocephalic and atraumatic. EOMI, sclera non-icteric, MMM  Cardiovascular: RRR, no r/m/g  Pulmonary: Lungs clear to auscultation bilaterally. No wheezes/ rhonchi/ rales  GI: +BS, soft, non-tender, non-distended  Extremities: No LE edema   Skin: warm and dry. No rashes or lesions   Neurologic: CN II-XII grossly intact. No focal neurologic deficit     Laboratory  Lab Results   Component Value Date    FDR2EMCDA NOT DETECTED 08/17/2023    [id number] 1.043 02/18/2025    VITD25 12 (A) 08/16/2021      Lab Results   Component Value Date    WBC 6.3 02/18/2025    HGB 13.6 02/18/2025    HCT 42.4 02/18/2025    MCV 94 02/18/2025     02/18/2025      Lab Results   Component Value Date    GLUCOSE 77 02/18/2025    CALCIUM 9.2 02/18/2025     02/18/2025    K 4.0 02/18/2025    CO2 27 02/18/2025     02/18/2025    BUN 12 02/18/2025    CREATININE 1.22 02/18/2025      Lab Results   Component Value Date    CHOL 203  "(H) 06/04/2025    CHOL 238 (H) 02/18/2025    CHOL 207 (H) 05/23/2024     Lab Results   Component Value Date    HDL 51 06/04/2025    HDL 47.5 02/18/2025    HDL 46.1 05/23/2024     Lab Results   Component Value Date    LDLCALC 123 (H) 06/04/2025    LDLCALC 165 (H) 02/18/2025    LDLCALC 112 (H) 05/23/2024     Lab Results   Component Value Date    TRIG 172 (H) 06/04/2025    TRIG 130 02/18/2025    TRIG 245 (H) 05/23/2024     No components found for: \"CHOLHDL\"      Assessment/Plan   Problem List Items Addressed This Visit       HIV infection - Primary    Current Assessment & Plan   Has been out of Biktarvy for past week due to reported pharmacy issues.  Prior had been adherent, last CD4 1043, VL UD.   - SW (tammy) will assist with prescription issues.         Relevant Orders    CD4/8 Panel    CBC and Auto Differential    Comprehensive Metabolic Panel    HIV RNA, quantitative, PCR    Syphilis Screen with Reflex    Hypertension    Current Assessment & Plan     -/102 today after increase losartan to 50 Mg daily in mid-june  -States he has had increased stress/anxiety 2/2 work and family issues  - Advised to get BP cuff and log at home  Repeat /94 , agreed to add hydrochlorothiazide to his losartan in combo pill         Relevant Medications    losartan-hydrochlorothiazide (Hyzaar) 50-12.5 mg tablet    Generalized anxiety disorder    Current Assessment & Plan   Prescribed lexapro 10mg daily by PCP but [facility]  - Also has referral to psychology          Other Visit Diagnoses         Human immunodeficiency virus (Multi)        Relevant Medications    Biktarvy -25 mg tablet           Health Maintenance  - Noncancer screening (e.g., HTN, T2DM, DLD, AAA):   - Cancer screening: None  - STI screening : Declines, syphilis testing  - Immunizations: Up to date   - Substance use: No tobacco use, alcohol use 2 days approximately 4-6 drinks per week, uses marijuana like anabolic but no other " recreational drugs.   - Dental: instructed f/u     RTC: 3 months   Gomez (Oswald Edwards MD  IM PGY-2  I saw and evaluated the patient. I personally obtained the key and critical portions of the history and physical exam or was physically present for key and critical portions performed by the resident/fellow. I reviewed the resident/fellow's documentation and discussed the patient with the resident/fellow. I agree with the resident/fellow's medical decision making as documented in the note.    Dipika John MD        [1]   Current Outpatient Medications:     escitalopram (Lexapro) 10 mg tablet, Take 1 tablet (10 mg) by mouth once daily., Disp: 90 tablet, Rfl: 3    Biktarvy -25 mg tablet, Take 1 tablet by mouth once daily., Disp: 30 tablet, Rfl: 5    losartan-hydrochlorothiazide (Hyzaar) 50-12.5 mg tablet, Take 1 tablet by mouth once daily., Disp: 30 tablet, Rfl: 11

## 2025-07-22 NOTE — ASSESSMENT & PLAN NOTE
-/102 today after increase losartan to 50 Mg daily in mid-june  -States he has had increased stress/anxiety 2/2 work and family issues  - Advised to get BP cuff and log at home  Repeat /94 , agreed to add hydrochlorothiazide to his losartan in combo pill

## 2025-07-23 ENCOUNTER — TELEPHONE (OUTPATIENT)
Dept: INFECTIOUS DISEASES | Facility: HOSPITAL | Age: 30
End: 2025-07-23
Payer: COMMERCIAL

## 2025-07-23 LAB
HIV1 RNA # PLAS NAA DL=20: NOT DETECTED {COPIES}/ML
HIV1 RNA SPEC NAA+PROBE-LOG#: NORMAL {LOG_COPIES}/ML

## 2025-07-23 NOTE — TELEPHONE ENCOUNTER
Hung contacted [facility] re: pt's med delivery - confirmed they can deliver tomorrow.  Sw notified pt.  Pt has no other questions for sw at this time.    Tie: 15

## 2025-07-29 ENCOUNTER — TELEPHONE (OUTPATIENT)
Dept: INFECTIOUS DISEASES | Facility: HOSPITAL | Age: 30
End: 2025-07-29
Payer: COMMERCIAL

## 2025-08-01 ENCOUNTER — APPOINTMENT (OUTPATIENT)
Dept: PRIMARY CARE | Facility: CLINIC | Age: 30
End: 2025-08-01
Payer: COMMERCIAL

## 2025-08-15 DIAGNOSIS — R79.89 ELEVATED LFTS: ICD-10-CM

## 2025-08-18 ENCOUNTER — APPOINTMENT (OUTPATIENT)
Dept: IMMUNOLOGY | Facility: CLINIC | Age: 30
End: 2025-08-18
Payer: COMMERCIAL

## 2025-08-26 ENCOUNTER — OFFICE VISIT (OUTPATIENT)
Dept: URGENT CARE | Age: 30
End: 2025-08-26
Payer: COMMERCIAL

## 2025-08-26 VITALS
OXYGEN SATURATION: 98 % | BODY MASS INDEX: 34.82 KG/M2 | TEMPERATURE: 98.7 F | RESPIRATION RATE: 17 BRPM | DIASTOLIC BLOOD PRESSURE: 92 MMHG | WEIGHT: 229 LBS | HEART RATE: 106 BPM | SYSTOLIC BLOOD PRESSURE: 149 MMHG

## 2025-08-26 DIAGNOSIS — F41.9 ANXIETY: Primary | ICD-10-CM

## 2025-08-26 PROCEDURE — 3080F DIAST BP >= 90 MM HG: CPT | Performed by: EMERGENCY MEDICINE

## 2025-08-26 PROCEDURE — 3077F SYST BP >= 140 MM HG: CPT | Performed by: EMERGENCY MEDICINE

## 2025-08-26 PROCEDURE — 99213 OFFICE O/P EST LOW 20 MIN: CPT | Performed by: EMERGENCY MEDICINE

## 2025-08-26 PROCEDURE — 4274F FLU IMMUNO ADMIND RCVD: CPT | Performed by: EMERGENCY MEDICINE

## 2025-08-26 ASSESSMENT — ENCOUNTER SYMPTOMS
HYPERACTIVE: 1
NERVOUS/ANXIOUS: 1